# Patient Record
Sex: MALE | Race: WHITE
[De-identification: names, ages, dates, MRNs, and addresses within clinical notes are randomized per-mention and may not be internally consistent; named-entity substitution may affect disease eponyms.]

---

## 2013-09-13 VITALS — DIASTOLIC BLOOD PRESSURE: 67 MMHG | SYSTOLIC BLOOD PRESSURE: 122 MMHG

## 2022-07-12 ENCOUNTER — HOSPITAL ENCOUNTER (OUTPATIENT)
Dept: HOSPITAL 33 - ED | Age: 77
Setting detail: OBSERVATION
LOS: 2 days | Discharge: HOME HEALTH | End: 2022-07-14
Attending: INTERNAL MEDICINE | Admitting: INTERNAL MEDICINE
Payer: MEDICARE

## 2022-07-12 DIAGNOSIS — M79.604: ICD-10-CM

## 2022-07-12 DIAGNOSIS — R41.82: ICD-10-CM

## 2022-07-12 DIAGNOSIS — L08.9: ICD-10-CM

## 2022-07-12 DIAGNOSIS — M86.661: Primary | ICD-10-CM

## 2022-07-12 DIAGNOSIS — K74.60: ICD-10-CM

## 2022-07-12 DIAGNOSIS — Z20.828: ICD-10-CM

## 2022-07-12 DIAGNOSIS — Z79.899: ICD-10-CM

## 2022-07-12 LAB
ALBUMIN SERPL-MCNC: 3.1 G/DL (ref 3.5–5)
ALP SERPL-CCNC: 134 U/L (ref 38–126)
ALT SERPL-CCNC: 15 U/L (ref 0–50)
ANION GAP SERPL CALC-SCNC: 9.9 MEQ/L (ref 5–15)
AST SERPL QL: 37 U/L (ref 17–59)
BASOPHILS # BLD AUTO: 0.02 X10^3/UL (ref 0–0.4)
BILIRUB BLD-MCNC: 0.8 MG/DL (ref 0.2–1.3)
BUN SERPL-MCNC: 14 MG/DL (ref 9–20)
CALCIUM SPEC-MCNC: 8.9 MG/DL (ref 8.4–10.2)
CHLORIDE SERPL-SCNC: 104 MMOL/L (ref 98–107)
CO2 SERPL-SCNC: 29 MMOL/L (ref 22–30)
CREAT SERPL-MCNC: 0.8 MG/DL (ref 0.66–1.25)
EOSINOPHIL # BLD AUTO: 0.02 X10^3/UL (ref 0–0.5)
FLUAV AG NPH QL IA: NEGATIVE
FLUBV AG NPH QL IA: NEGATIVE
GFR SERPLBLD BASED ON 1.73 SQ M-ARVRAT: > 60 ML/MIN
GLUCOSE SERPL-MCNC: 91 MG/DL (ref 74–106)
GLUCOSE UR-MCNC: NEGATIVE MG/DL
HCT VFR BLD AUTO: 32.1 % (ref 42–50)
HGB BLD-MCNC: 10.2 G/DL (ref 12.5–18)
LYMPHOCYTES # SPEC AUTO: 1.18 X10^3/UL (ref 1–4.6)
MCH RBC QN AUTO: 30.6 PG (ref 26–32)
MCHC RBC AUTO-ENTMCNC: 31.8 G/DL (ref 32–36)
MONOCYTES # BLD AUTO: 0.48 X10^3/UL (ref 0–1.3)
PLATELET # BLD AUTO: 166 X10^3/UL (ref 150–450)
POTASSIUM SERPLBLD-SCNC: 3.9 MMOL/L (ref 3.5–5.1)
PROT SERPL-MCNC: 6.9 G/DL (ref 6.3–8.2)
PROT UR STRIP-MCNC: 30 MG/DL
RBC # BLD AUTO: 3.33 X10^6/UL (ref 4.1–5.6)
RBC # UR AUTO: (no result) ERY/UL (ref 0–5)
RBC #/AREA URNS HPF: (no result) /HPF (ref 0–2)
RSV AG SPEC QL IA: NEGATIVE
SARS-COV-2 AG RESP QL IA.RAPID: NEGATIVE
SODIUM SERPL-SCNC: 138 MMOL/L (ref 137–145)
UA DIPSTICK PNL UR: (no result)
URINE CULTURED INDICATED?: YES
WBC # BLD AUTO: 5.7 X10^3/UL (ref 4–10.5)
WBC #/AREA URNS HPF: (no result) /HPF (ref 0–5)

## 2022-07-12 PROCEDURE — 87070 CULTURE OTHR SPECIMN AEROBIC: CPT

## 2022-07-12 PROCEDURE — 71045 X-RAY EXAM CHEST 1 VIEW: CPT

## 2022-07-12 PROCEDURE — 81015 MICROSCOPIC EXAM OF URINE: CPT

## 2022-07-12 PROCEDURE — 0241U: CPT

## 2022-07-12 PROCEDURE — 85025 COMPLETE CBC W/AUTO DIFF WBC: CPT

## 2022-07-12 PROCEDURE — 99284 EMERGENCY DEPT VISIT MOD MDM: CPT

## 2022-07-12 PROCEDURE — 87086 URINE CULTURE/COLONY COUNT: CPT

## 2022-07-12 PROCEDURE — 85652 RBC SED RATE AUTOMATED: CPT

## 2022-07-12 PROCEDURE — 36415 COLL VENOUS BLD VENIPUNCTURE: CPT

## 2022-07-12 PROCEDURE — G0378 HOSPITAL OBSERVATION PER HR: HCPCS

## 2022-07-12 PROCEDURE — 87040 BLOOD CULTURE FOR BACTERIA: CPT

## 2022-07-12 PROCEDURE — 73560 X-RAY EXAM OF KNEE 1 OR 2: CPT

## 2022-07-12 PROCEDURE — 80053 COMPREHEN METABOLIC PANEL: CPT

## 2022-07-12 PROCEDURE — 83605 ASSAY OF LACTIC ACID: CPT

## 2022-07-12 RX ADMIN — CELECOXIB SCH MG: 100 CAPSULE ORAL at 21:49

## 2022-07-12 RX ADMIN — PREGABALIN SCH MG: 50 CAPSULE ORAL at 21:49

## 2022-07-12 RX ADMIN — METRONIDAZOLE SCH MLS/HR: 500 INJECTION, SOLUTION INTRAVENOUS at 18:47

## 2022-07-12 RX ADMIN — OXYCODONE HYDROCHLORIDE PRN MG: 5 TABLET ORAL at 22:33

## 2022-07-12 RX ADMIN — DEXTROSE MONOHYDRATE SCH MLS/HR: 50 INJECTION, SOLUTION INTRAVENOUS at 18:11

## 2022-07-12 NOTE — XRAY
Indication: Injection to right knee.



Comparison: None



AP/crosstable lateral right knee using portable technique demonstrates

posterior femur shaft cortical fracture of uncertain chronicity.  Elsewhere

osteopenia, total knee arthroplasty with intact prosthesis, chronic appearing

tibial deformity presumed sequela of old injury, and moderate scattered

vascular calcifications.

## 2022-07-12 NOTE — XRAY
Indication: Confusion.



Comparison: None



Portable chest hyperinflated and clear with a few incidental tiny calcified

granulomas.  Heart not enlarged with left central venous access catheter.

Bony thorax intact with mild osteopenia, advanced bilateral shoulder

degenerative arthropathy, mild double curvature scoliosis, and remote T11-T12

compression fractures.



Impression: Nonacute chest with chronic features.

## 2022-07-12 NOTE — PCM.HP
History of Present Illness





- Chief Complaint


Chief Complaint: pain in leg right side


History of Present Illness: 


 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 

factor VIII presents with confusion.  The assessment by his caregiver and power 

of  is that yesterday he was more confused he seemed very out of it and 

was not even fully dressed yesterday.  The caregiver did visit him 4 times today

he seemed better but he decided to bring him to the ER.  He is followed for his 

hemophilia at [facility] and he is followed for chronic infection of an 

artificial knee at wound care at [facility].  Caregiver is concerned that

this might represent a recurrence of generalized sepsis since he has been 

confused with infection in the past.Presently he is on doxycycline and a culture

was done at [facility] well want wound care on 7 8 which grew negati

ve.The caregiver also reports that the patient has infection in the right leg 

which is not responded to antibiotics he was told that the only cure would be to

amputate the right lower extremity just below the hip but that was not done 

because the risk of surgery for this patient is too great.


Timing/Duration: day(s) (2)


Severity: moderate


Associated Symptoms: loss of appetite, malaise, weakness








- Review of Systems


Constitutional: No Fever, No Chills


Eyes: No Symptoms


Ears, Nose, & Throat: No Symptoms


Respiratory: No Cough, No Short Of Breath


Cardiac: No Chest Pain, No Edema, No Syncope


Abdominal/Gastrointestinal: No Abdominal Pain, No Nausea, No Vomiting, No 

Diarrhea


Genitourinary Symptoms: No Dysuria


Musculoskeletal: Joint Redness, Joint Pain, No Back Pain, No Neck Pain


Skin: No Rash


Neurological: No Dizziness, No Focal Weakness, No Sensory Changes


Psychological: No Symptoms


Endocrine: No Symptoms


Hematologic/Lymphatic: No Symptoms


Immunological/Allergic: No Symptoms





Medications & Allergies


Home Medications: 


                              Home Medication List





Factor XIII [Corifact] 3,000 units IV BID 05/15/13 [History Confirmed 07/12/22]


Lorazepam 1 mg*** [Ativan 1 MG***] 1 - 2 mg PO HS PRN 05/15/13 [History 

Confirmed 07/12/22]


Methadone HCl 10 mg*** [DOLOPHINE 10MG Tablet***] 2 tab PO TID PRN PRN 05/15/13 

[History Confirmed 07/12/22]


Celecoxib [Celebrex] 200 mg PO BID 08/20/13 [History Confirmed 07/12/22]


Docusate Sodium 100 mg*** [Docusate Sodium 100 MG***] 100 mg PO DAILY 07/12/22 

[History Confirmed 07/12/22]


Famotidine 20 mg PO BID 07/12/22 [History Confirmed 07/12/22]


Folic Acid/Vit B Complex and C [B-Complex Plus Vitamin C] 1 each PO DAILY 

07/12/22 [History Confirmed 07/12/22]


Gabapentin *** [Neurontin ***] 1 - 2 tab PO TID 07/12/22 [History Confirmed 

07/12/22]


Oxycodone HCl 15 mg PO Q4H PRN PRN 07/12/22 [History Confirmed 07/12/22]


Pregabalin 50 mg*** [Lyrica 50MG***] 50 mg PO BID 07/12/22 [History Confirmed 

07/12/22]


ondansetron HCL [Zofran] 4 mg PO DAILY 07/12/22 [History Confirmed 07/12/22]








Allergies/Adverse Reactions: 


                                    Allergies











Allergy/AdvReac Type Severity Reaction Status Date / Time


 


No Known Drug Allergies Allergy   Verified 07/12/22 17:54














- Past Medical History


Past Medical History: Yes


Neurological History: No Pertinent History


ENT History: No Pertinent History


Cardiac History: No Pertinent History


Respiratory History: No Pertinent History


Endocrine Medical History: No Pertinent History


Musculoskelatal History: Osteoarthritis, Osteoporosis


GI Medical History: Cirrhosis


 History: No Pertinent History


Pyscho-Social History: No Pertinent History


Male Reproductive Disorders: No Pertinent History


Comment: Bilat Total knee, R total hip. Hx of hemophilia





- Past Surgical History


Past Surgical History: Yes


Neuro Surgical History: No Pertinent History


Cardiac History: No Pertinent History


Respiratory Surgery: No Pertinent History


GI Surgical History: No Pertinent History


Genitourinary Surgical Hx: No Pertinent History


Musculskeletal Surgical Hx: Joint Replacement, Orthopedic Surgery


Male Surgical History: No Pertinent History


Other Surgical History: left elbow spurs removed, Bilateral knee replacements, 

Left hip replacement.





- Social History


Smoking Status: Never smoker


Exposure to second hand smoke: No


Alcohol: None


Drug Use: none





- Physical Exam


Vital Signs: 


                               Vital Signs - 24 hr











  Temp Pulse Resp BP Pulse Ox


 


 07/12/22 20:00  96.8 F  67  16  168/77  98


 


 07/12/22 17:33  98.7 F  71  19  178/77  97


 


 07/12/22 15:31      100


 


 07/12/22 15:00   71  17  175/81  99


 


 07/12/22 14:11   75  17  172/84  100


 


 07/12/22 13:11  98.6 F  79  19  181/93  100











General Appearance: no apparent distress, alert


Neurologic Exam: alert, oriented x 3, cooperative, normal mood/affect, nml 

cerebellar function, nml station & gait, sensation nml, No motor deficits


Eye Exam: PERRL/EOMI, eyes nml inspection


Ears, Nose, Throat Exam: normal ENT inspection, TMs normal, pharynx normal, 

moist mucous membranes


Neck Exam: normal inspection, non-tender, supple, full range of motion


Respiratory Exam: normal breath sounds, lungs clear, No respiratory distress


Cardiovascular Exam: regular rate/rhythm, normal heart sounds, normal peripheral

pulses


Gastrointestinal/Abdomen Exam: soft, normal bowel sounds, No tenderness, No mass


Back Exam: normal inspection, normal range of motion, No CVA tenderness, No 

vertebral tenderness


Extremity Exam: normal inspection, normal range of motion, pelvis stable


Skin Exam: normal color, warm, dry, No rash


Lymphatic Exam: No adenopathy





Results





- Labs


Lab/Micro Results: 


                            Lab Results-Last 24 Hours











  07/12/22 07/12/22 07/12/22 Range/Units





  13:30 13:30 13:30 


 


WBC  5.7    (4.0-10.5)  x10^3/uL


 


RBC  3.33 L    (4.1-5.6)  x10^6/uL


 


Hgb  10.2 L    (12.5-18.0)  g/dL


 


Hct  32.1 L    (42-50)  %


 


MCV  96.4    ()  fL


 


MCH  30.6    (26-32)  pg


 


MCHC  31.8 L    (32-36)  g/dL


 


RDW  13.2    (11.5-14.0)  %


 


Plt Count  166    (150-450)  x10^3/uL


 


MPV  9.5    (7.5-11.0)  fL


 


Gran %  69.7 H    (36.0-66.0)  %


 


Immature Gran % (Auto)  0.2    (0.00-0.4)  %


 


Nucleat RBC Rel Count  0.0    (0.00-0.1)  %


 


Eos # (Auto)  0.02    (0-0.5)  x10^3/uL


 


Immature Gran # (Auto)  0.01    (0.00-0.03)  x10^3u/L


 


Absolute Lymphs (auto)  1.18    (1.0-4.6)  x10^3/uL


 


Absolute Monos (auto)  0.48    (0.0-1.3)  x10^3/uL


 


Absolute Nucleated RBC  0.00    (0.00-0.01)  x10^3u/L


 


Lymphocytes %  20.8 L    (24.0-44.0)  %


 


Monocytes %  8.5    (0.0-12.0)  %


 


Eosinophils %  0.4    (0.00-5.0)  %


 


Basophils %  0.4    (0.0-0.4)  %


 


Absolute Granulocytes  3.95    (1.4-6.9)  x10^3/uL


 


Basophils #  0.02    (0-0.4)  x10^3/uL


 


ESR     (0-15)  mm/hr


 


Sodium    138  (137-145)  mmol/L


 


Potassium    3.9  (3.5-5.1)  mmol/L


 


Chloride    104  ()  mmol/L


 


Carbon Dioxide    29  (22-30)  mmol/L


 


Anion Gap    9.9  (5-15)  MEQ/L


 


BUN    14  (9-20)  mg/dL


 


Creatinine    0.80  (0.66-1.25)  mg/dL


 


Estimated GFR    > 60.0  ML/MIN


 


Glucose    91  ()  mg/dL


 


Lactic Acid   0.8   (0.4-2.0)  


 


Calcium    8.9  (8.4-10.2)  mg/dL


 


Total Bilirubin    0.80  (0.2-1.3)  mg/dL


 


AST    37  (17-59)  U/L


 


ALT    15  (0-50)  U/L


 


Alkaline Phosphatase    134 H  ()  U/L


 


Serum Total Protein    6.9  (6.3-8.2)  g/dL


 


Albumin    3.1 L  (3.5-5.0)  g/dL


 


Urinalys Dipstick Clnc     


 


Urine Color     (YELLOW)  


 


Urine Appearance     (CLEAR)  


 


Urine pH     (5-6)  


 


Ur Specific Gravity     (1.005-1.025)  


 


POC Urine Protein Conf     (Negative)  


 


Urine Ketones     (NEGATIVE)  


 


Urine Nitrite     (NEGATIVE)  


 


Urine Bilirubin     (NEGATIVE)  


 


Urine Urobilinogen     (0-1)  mg/dL


 


Urine Leukocytes     (NEGATIVE)  


 


Urine WBC (Auto)     (0-5)  /HPF


 


Urine RBC (Auto)     (0-2)  /HPF


 


U Epithel Cells (Auto)     (FEW)  /HPF


 


Urine Bacteria (Auto)     (NEGATIVE)  /HPF


 


Urine RBC     (0-5)  Kevin/ul


 


Urine Mucus (Auto)     (NEGATIVE)  /HPF


 


Ur Culture Indicated?     


 


Urine Glucose     (NEGATIVE)  mg/dL


 


Influenza Type A Ag     (NEGATIVE)  


 


Influenza Type B Ag     (NEGATIVE)  


 


RSV (PCR)     (Negative)  


 


SARS-CoV-2 (PCR)     (NEGATIVE)  














  07/12/22 07/12/22 07/12/22 Range/Units





  13:58 14:06 15:10 


 


WBC     (4.0-10.5)  x10^3/uL


 


RBC     (4.1-5.6)  x10^6/uL


 


Hgb     (12.5-18.0)  g/dL


 


Hct     (42-50)  %


 


MCV     ()  fL


 


MCH     (26-32)  pg


 


MCHC     (32-36)  g/dL


 


RDW     (11.5-14.0)  %


 


Plt Count     (150-450)  x10^3/uL


 


MPV     (7.5-11.0)  fL


 


Gran %     (36.0-66.0)  %


 


Immature Gran % (Auto)     (0.00-0.4)  %


 


Nucleat RBC Rel Count     (0.00-0.1)  %


 


Eos # (Auto)     (0-0.5)  x10^3/uL


 


Immature Gran # (Auto)     (0.00-0.03)  x10^3u/L


 


Absolute Lymphs (auto)     (1.0-4.6)  x10^3/uL


 


Absolute Monos (auto)     (0.0-1.3)  x10^3/uL


 


Absolute Nucleated RBC     (0.00-0.01)  x10^3u/L


 


Lymphocytes %     (24.0-44.0)  %


 


Monocytes %     (0.0-12.0)  %


 


Eosinophils %     (0.00-5.0)  %


 


Basophils %     (0.0-0.4)  %


 


Absolute Granulocytes     (1.4-6.9)  x10^3/uL


 


Basophils #     (0-0.4)  x10^3/uL


 


ESR   52 H   (0-15)  mm/hr


 


Sodium     (137-145)  mmol/L


 


Potassium     (3.5-5.1)  mmol/L


 


Chloride     ()  mmol/L


 


Carbon Dioxide     (22-30)  mmol/L


 


Anion Gap     (5-15)  MEQ/L


 


BUN     (9-20)  mg/dL


 


Creatinine     (0.66-1.25)  mg/dL


 


Estimated GFR     ML/MIN


 


Glucose     ()  mg/dL


 


Lactic Acid     (0.4-2.0)  


 


Calcium     (8.4-10.2)  mg/dL


 


Total Bilirubin     (0.2-1.3)  mg/dL


 


AST     (17-59)  U/L


 


ALT     (0-50)  U/L


 


Alkaline Phosphatase     ()  U/L


 


Serum Total Protein     (6.3-8.2)  g/dL


 


Albumin     (3.5-5.0)  g/dL


 


Urinalys Dipstick Clnc  MAIN LAB    


 


Urine Color  YELLOW    (YELLOW)  


 


Urine Appearance  CLEAR    (CLEAR)  


 


Urine pH  6.5    (5-6)  


 


Ur Specific Gravity  1.025    (1.005-1.025)  


 


POC Urine Protein Conf  30    (Negative)  


 


Urine Ketones  SMALL-15    (NEGATIVE)  


 


Urine Nitrite  NEGATIVE    (NEGATIVE)  


 


Urine Bilirubin  NEGATIVE    (NEGATIVE)  


 


Urine Urobilinogen  0.2    (0-1)  mg/dL


 


Urine Leukocytes  SMALL    (NEGATIVE)  


 


Urine WBC (Auto)  26-50    (0-5)  /HPF


 


Urine RBC (Auto)  26-50    (0-2)  /HPF


 


U Epithel Cells (Auto)  RARE    (FEW)  /HPF


 


Urine Bacteria (Auto)  FEW    (NEGATIVE)  /HPF


 


Urine RBC  MODERATE    (0-5)  Kevin/ul


 


Urine Mucus (Auto)  SLIGHT    (NEGATIVE)  /HPF


 


Ur Culture Indicated?  YES    


 


Urine Glucose  NEGATIVE    (NEGATIVE)  mg/dL


 


Influenza Type A Ag    NEGATIVE  (NEGATIVE)  


 


Influenza Type B Ag    NEGATIVE  (NEGATIVE)  


 


RSV (PCR)    NEGATIVE  (Negative)  


 


SARS-CoV-2 (PCR)    NEGATIVE  (NEGATIVE)  














- Radiology Impressions


Radiology Exams & Impressions: 


                              Radiology Procedures











 Category Date Time Status


 


 CHEST 1 VIEW (PORTABLE) Stat Exams  07/12/22 13:23 Completed


 


 KNEE (1 OR 2 VIEW) Stat Exams  07/12/22 13:23 Completed














Assessment/Plan


(1) Chronic osteomyelitis involving lower leg


Current Visit: Yes   Status: Acute   


Qualifiers: 


   Laterality: right   Qualified Code(s): M86.661 - Other chronic osteomyelitis,

right tibia and fibula   


Assessment & Plan: 


                                 Chief Complaint





Diagnosis                        osteomylitis





                                    Allergies











Allergy/AdvReac Type Severity Reaction Status Date / Time


 


No Known Drug Allergies Allergy   Verified 07/12/22 17:54








                           Vital Signs (Last 24 hours)











  Temp Pulse Resp BP Pulse Ox


 


 07/12/22 20:00  96.8 F  67  16  168/77  98


 


 07/12/22 17:33  98.7 F  71  19  178/77  97


 


 07/12/22 15:31      100


 


 07/12/22 15:00   71  17  175/81  99


 


 07/12/22 14:11   75  17  172/84  100


 


 07/12/22 13:11  98.6 F  79  19  181/93  100








                                Home Medications











 Medication  Instructions  Recorded  Confirmed  Last Taken  Type


 


Docusate Sodium 100 mg*** 100 mg PO DAILY 07/12/22 07/12/22 Unknown History





[Docusate Sodium 100 MG***]     


 


Famotidine 20 mg PO BID 07/12/22 07/12/22 Unknown History


 


Folic Acid/Vit B Complex and C 1 each PO DAILY 07/12/22 07/12/22 Unknown History





[B-Complex Plus Vitamin C]     


 


Gabapentin *** [Neurontin ***] 1 - 2 tab PO TID 07/12/22 07/12/22 Unknown 

History


 


Oxycodone HCl 15 mg PO Q4H PRN PRN 07/12/22 07/12/22 Unknown History


 


Pregabalin 50 mg*** [Lyrica 50 mg PO BID 07/12/22 07/12/22 Unknown History





50MG***]     


 


ondansetron HCL [Zofran] 4 mg PO DAILY 07/12/22 07/12/22 Unknown History








                               Current Medications











Generic Name Dose Route Start Last Admin





  Trade Name Freq  PRN Reason Stop Dose Admin


 


Celecoxib  200 mg  07/12/22 22:00 





  Celecoxib 100 Mg Capsule  PO  08/11/22 21:59 





  BID ABNER  


 


Famotidine  20 mg  07/12/22 22:00 





  Famotidine 20 Mg Tablet  PO  07/12/22 22:01 





  ONCE ONE  


 


Gabapentin  300 mg  07/12/22 22:00 





  Gabapentin 300 Mg Capsule  PO  08/11/22 21:59 





  BID ABNER  


 


Sodium Chloride  1,000 mls @ 100 mls/hr  07/12/22 13:30  07/12/22 13:33





  Sodium Chloride 0.9% 1000 Ml  IV  08/11/22 13:29  100 mls/hr





  .Q10H ABNER   Administration


 


Ceftazidime 2 gm/ Dextrose  100 mls @ 200 mls/hr  07/12/22 15:30  07/12/22 18:11





  IV  08/11/22 15:29  200 mls/hr





  Q8HT ABNER   Administration


 


Metronidazole  500 mg in 100 mls @ 200 mls/hr  07/12/22 18:00  07/12/22 18:47





  Flagyl 500 Mg Ivpb  IV  08/11/22 17:59  200 mls/hr





  Q6HT ABNER   Administration


 


Vancomycin HCl 500 gm/ Sodium  250 mls @ 125 mls/hr  07/13/22 22:00 





  Chloride  IV  08/12/22 21:59 





  Q12HT ABNER  


 


Lorazepam  1 mg  07/12/22 22:00 





  Lorazepam 1 Mg Tablet  PO  08/11/22 21:59 





  HS ABNER  


 


Methadone HCl  20 mg  07/12/22 22:00  07/12/22 18:11





  Methadone Hcl 10 Mg Tab  PO  07/17/22 21:59  20 mg





  TID ANBER   Administration


 


Miscellaneous Medication  1 ea  07/12/22 22:00  07/12/22 20:26





  Miscellaneous Medication Order 1 Ea Each    07/12/22 22:01  1 ea





  NOW ONE   Administration


 


Ondansetron HCl  4 mg  07/12/22 15:33 





  Ondansetron Hcl 4 Mg/2 Ml Vial  IV  08/11/22 15:32 





  Q6H PRN PRN  





  NAUSEA/VOMITING  


 


Pregabalin  50 mg  07/12/22 22:00 





  Pregabalin 50 Mg Capsule  PO  08/11/22 21:59 





  BID ABNER  














Discontinued Medications














Generic Name Dose Route Start Last Admin





  Trade Name Freq  PRN Reason Stop Dose Admin


 


Bacitracin Zinc  Confirm  07/12/22 13:26 





  Bacitracin Packet 1 Each Pckt  Administered  07/12/22 13:27 





  Dose  





  10 each  





  .ROUTE  





  .STK-MED ONE  


 


Bacitracin Zinc  9 each  07/12/22 13:43  07/12/22 13:44





  Bacitracin Packet 1 Each Pckt  TP  07/12/22 13:44  9 each





  STAT ONE   Administration


 


Vancomycin HCl  1 gm in 200 mls @ 125 mls/hr  07/12/22 15:17  07/12/22 16:03





  Vancomycin 1 Gram/200 Ml Bag  IV  07/12/22 16:52  125 mls/hr





  STAT ONE   125 mls/hr





    Administration


 


Metronidazole  500 mg in 100 mls @ 200 mls/hr  07/12/22 15:19  07/12/22 16:01





  Flagyl 500 Mg Ivpb  IV  07/12/22 15:48  Infused





  STAT STA   Infusion


 


Metronidazole  Confirm  07/12/22 15:23 





  Flagyl 500 Mg Ivpb  Administered  07/12/22 15:24 





  Dose  





  500 mg in 100 mls @ ud  





  IV  





  .STK-MED ONE  


 


Vancomycin HCl  Confirm  07/12/22 16:03 





  Vancomycin 1 Gram/200 Ml Bag  Administered  07/12/22 16:04 





  Dose  





  1 gm in 200 mls @ ud  





  IV  





  .STK-MED ONE  


 


Vancomycin HCl  1 gm in 200 mls @ 125 mls/hr  07/12/22 16:15  07/12/22 18:38





  Vancomycin 1 Gram/200 Ml Bag  IV  08/11/22 16:14  Not Given





  Q12H ABNER  


 


Methadone HCl  Confirm  07/12/22 18:06 





  Methadone Hcl 10 Mg Tab  Administered  07/12/22 18:07 





  Dose  





  20 mg  





  .ROUTE  





  .STK-MED ONE  








                         Intake & Output (Last 24 hours)











 07/10/22 07/11/22 07/12/22 07/13/22





 11:59 11:59 11:59 11:59


 


Intake Total    120


 


Balance    120


 


Weight    46.5 kg








                      Microbiology Results (Last 24 hours)





07/12/22 13:58   Clean Catch Midstream   Urine Culture - Pending


07/12/22 13:25   Skin - Right Front   Wound Culture - Pending


07/12/22 13:30   Blood   Blood Culture Gram Stain - Pending


07/12/22 13:30   Blood   Blood Culture - Pending





                       Laboratory Results (Last 24 hours)











  07/12/22 07/12/22 07/12/22





  15:10 14:06 13:58


 


WBC   


 


RBC   


 


Hgb   


 


Hct   


 


MCV   


 


MCH   


 


MCHC   


 


RDW   


 


Plt Count   


 


MPV   


 


Gran %   


 


Immature Gran % (Auto)   


 


Nucleat RBC Rel Count   


 


Eos # (Auto)   


 


Immature Gran # (Auto)   


 


Absolute Lymphs (auto)   


 


Absolute Monos (auto)   


 


Absolute Nucleated RBC   


 


Lymphocytes %   


 


Monocytes %   


 


Eosinophils %   


 


Basophils %   


 


Absolute Granulocytes   


 


Basophils #   


 


ESR   52 H 


 


Sodium   


 


Potassium   


 


Chloride   


 


Carbon Dioxide   


 


Anion Gap   


 


BUN   


 


Creatinine   


 


Estimated GFR   


 


Glucose   


 


Lactic Acid   


 


Calcium   


 


Total Bilirubin   


 


AST   


 


ALT   


 


Alkaline Phosphatase   


 


Serum Total Protein   


 


Albumin   


 


Urinalys Dipstick Clnc    MAIN LAB


 


Urine Color    YELLOW


 


Urine Appearance    CLEAR


 


Urine pH    6.5


 


Ur Specific Gravity    1.025


 


POC Urine Protein Conf    30


 


Urine Ketones    SMALL-15


 


Urine Nitrite    NEGATIVE


 


Urine Bilirubin    NEGATIVE


 


Urine Urobilinogen    0.2


 


Urine Leukocytes    SMALL


 


Urine WBC (Auto)    26-50


 


Urine RBC (Auto)    26-50


 


U Epithel Cells (Auto)    RARE


 


Urine Bacteria (Auto)    FEW


 


Urine RBC    MODERATE


 


Urine Mucus (Auto)    SLIGHT


 


Ur Culture Indicated?    YES


 


Urine Glucose    NEGATIVE


 


Influenza Type A Ag  NEGATIVE  


 


Influenza Type B Ag  NEGATIVE  


 


RSV (PCR)  NEGATIVE  


 


SARS-CoV-2 (PCR)  NEGATIVE  














  07/12/22 07/12/22 07/12/22





  13:30 13:30 13:30


 


WBC    5.7


 


RBC    3.33 L


 


Hgb    10.2 L


 


Hct    32.1 L


 


MCV    96.4


 


MCH    30.6


 


MCHC    31.8 L


 


RDW    13.2


 


Plt Count    166


 


MPV    9.5


 


Gran %    69.7 H


 


Immature Gran % (Auto)    0.2


 


Nucleat RBC Rel Count    0.0


 


Eos # (Auto)    0.02


 


Immature Gran # (Auto)    0.01


 


Absolute Lymphs (auto)    1.18


 


Absolute Monos (auto)    0.48


 


Absolute Nucleated RBC    0.00


 


Lymphocytes %    20.8 L


 


Monocytes %    8.5


 


Eosinophils %    0.4


 


Basophils %    0.4


 


Absolute Granulocytes    3.95


 


Basophils #    0.02


 


ESR   


 


Sodium  138  


 


Potassium  3.9  


 


Chloride  104  


 


Carbon Dioxide  29  


 


Anion Gap  9.9  


 


BUN  14  


 


Creatinine  0.80  


 


Estimated GFR  > 60.0  


 


Glucose  91  


 


Lactic Acid   0.8 


 


Calcium  8.9  


 


Total Bilirubin  0.80  


 


AST  37  


 


ALT  15  


 


Alkaline Phosphatase  134 H  


 


Serum Total Protein  6.9  


 


Albumin  3.1 L  


 


Urinalys Dipstick Clnc   


 


Urine Color   


 


Urine Appearance   


 


Urine pH   


 


Ur Specific Gravity   


 


POC Urine Protein Conf   


 


Urine Ketones   


 


Urine Nitrite   


 


Urine Bilirubin   


 


Urine Urobilinogen   


 


Urine Leukocytes   


 


Urine WBC (Auto)   


 


Urine RBC (Auto)   


 


U Epithel Cells (Auto)   


 


Urine Bacteria (Auto)   


 


Urine RBC   


 


Urine Mucus (Auto)   


 


Ur Culture Indicated?   


 


Urine Glucose   


 


Influenza Type A Ag   


 


Influenza Type B Ag   


 


RSV (PCR)   


 


SARS-CoV-2 (PCR)   








                             Orders (Last 24 hours)











 Category Date Time Status


 


 Bedrest ROUTINE Activity  07/12/22 15:34 Active


 


 Code Status Order ROUTINE Care  07/12/22 15:32 Active


 


 IV Care Q6H Care  07/12/22 15:32 Active


 


 Neuro Checks Q4H Care  07/12/22 15:33 Active


 


 Place in Observation ROUTINE Care  07/12/22 15:32 Active


 


 Vital Signs Q4H Care  07/12/22 15:33 Active


 


 Weight,Daily 0600 Care  07/12/22 15:33 Active


 


 House Regular Diet Diet  07/12/22 Dinner Active


 


 CHEST 1 VIEW (PORTABLE) Stat Exams  07/12/22 13:23 Completed


 


 KNEE (1 OR 2 VIEW) Stat Exams  07/12/22 13:23 Completed


 


 BLOOD CULTURE Stat Lab  07/12/22 13:17 Received


 


 CBC W DIFF AM.LAB Lab  07/13/22 04:00 Ordered


 


 CBC W DIFF Stat Lab  07/12/22 13:30 Completed


 


 CMP AM.LAB Lab  07/13/22 04:00 Ordered


 


 CMP Stat Lab  07/12/22 13:30 Completed


 


 COVID/FLU/RSV Panel Stat Lab  07/12/22 15:10 Completed


 


 CULTURE,URINE Stat Lab  07/12/22 13:58 Received


 


 CULTURE,WOUND Stat Lab  07/12/22 13:25 Received


 


 Lactic Acid Stat Lab  07/12/22 13:30 Completed


 


 SED RATE [Erythrocyte Sedimentation Rate] Stat Lab  07/12/22 14:06 Completed


 


 UA W/RFX CULTURE Stat Lab  07/12/22 13:58 Completed


 


 Bacitracin Packet*** [Baciguent Packet***] Med  07/12/22 13:26 Discontinued





 10 each .ROUTE .STK-MED ONE   


 


 Bacitracin Packet*** [Baciguent Packet***] Med  07/12/22 13:43 Discontinued





 9 each TP STAT ONE   


 


 Ceftazidime Pentahydrate [Fortaz/Tazicef] 2 gm Med  07/12/22 15:30 Active





 D5w 100 ml [D5w 100ML Mini Bag 100 ML] 100 ml   





 IV Q8HT   


 


 Celecoxib 100 mg** [celeBREX 100 MG**] Med  07/12/22 22:00 Active





 200 mg PO BID   


 


 Famotidine 20 mg*** [Pepcid 20 MG***] Med  07/12/22 22:00 Once





 20 mg PO ONCE ONE   


 


 Gabapentin *** [Neurontin ***] Med  07/12/22 22:00 Active





 300 mg PO BID   


 


 Lorazepam 1 mg*** [Ativan 1 MG***] Med  07/12/22 22:00 Active





 1 mg PO HS   


 


 Methadone HCl 10 mg*** [DOLOPHINE 10MG Tablet***] Med  07/12/22 18:06 

Discontinued





 20 mg .ROUTE .STK-MED ONE   


 


 Methadone HCl 10 mg*** [DOLOPHINE 10MG Tablet***] Med  07/12/22 22:00 Active





 20 mg PO TID   


 


 Metronidazole 500 mg Premix [Flagyl 500 mg Ivpb] Med  07/12/22 18:00 Active





 500 mg in 100 ml IV Q6HT   


 


 Metronidazole 500 mg Premix [Flagyl 500 mg Ivpb] Med  07/12/22 15:19 

Discontinued





 500 mg in 100 ml IV STAT   


 


 Metronidazole 500 mg Premix [Flagyl 500 mg Ivpb] Med  07/12/22 15:23 

Discontinued





 500 mg in 100 ml IV UD   


 


 Miscellaneous Medication Order Med  07/12/22 22:00 Once





 1 ea MC NOW ONE   


 


 NaCl 0.9% 1000 ml [Sodium Chloride 0.9% 1000 ML] 1,000 Med  07/12/22 13:30 

Active





 ml   





  mls/hr   


 


 Ondansetron HCl 4 mg/2 ml** [Zofran 4 MG/2 ML VIAL**] Med  07/12/22 15:33 

Active





 4 mg IV Q6H PRN PRN   


 


 Pregabalin 50 mg*** [Lyrica 50MG***] Med  07/12/22 22:00 Active





 50 mg PO BID   


 


 Vancomycin HCl Inj*** [Vancocin Injection***] 500 gm Med  07/13/22 22:00 Active





 NaCl 0.9% 250 ml [Sodium Chloride 0.9% 250 ML] 250 ml   





 IV Q12HT   


 


 Vancomycin/Water For Inj (Peg) [Vancomycin 1 Gram/200 Med  07/12/22 16:15 

Discontinued





 ml Bag]   





 1 gm in 200 ml IV Q12H   


 


 Vancomycin/Water For Inj (Peg) [Vancomycin 1 Gram/200 Med  07/12/22 15:17 

Discontinued





 ml Bag]   





 1 gm in 200 ml IV STAT   


 


 Vancomycin/Water For Inj (Peg) [Vancomycin 1 Gram/200 Med  07/12/22 16:03 

Discontinued





 ml Bag]   





 1 gm in 200 ml IV UD   


 


 Transfer Order Routine Transfer  07/12/22 Completed











Code(s): M86.669 - OTHER CHRONIC OSTEOMYELITIS, UNSPECIFIED TIBIA AND FIBULA

## 2022-07-12 NOTE — ERPHSYRPT
- History of Present Illness


Time Seen by Provider: 07/12/22 13:20


Source: patient (Patient was reported confused on admission but at the time of 

the exam was alert and oriented), other (Power of  caregiver)


Patient Subjective Stated Complaint: Confusion


Triage Nursing Assessment: Patient brought into ED per w/c and transferred to 

bed with assist of 1. Patient A+O X 3. Patient's skin pale, warm and dry. 

Patient's POA states patient has been having intermittent confusion and doesn't 

remember him coming to check on him several times yesterday and today. Patient 

has infected wounds noted to RLE.  Patient complains of right elbow pain and 

joint pain 5/10.


Physician History: 





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

VIII presents with confusion.  The assessment by his caregiver and power of at

[facility] is that yesterday he was more confused he seemed very out of it and was 

not even fully dressed yesterday.  The caregiver did visit him 4 times today he 

seemed better but he decided to bring him to the ER.  He is followed for his 

hemophilia at [facility] and he is followed for chronic infection of an 

artificial knee at wound care at [facility].  Caregiver is concerned that

this might represent a recurrence of generalized sepsis since he has been 

confused with infection in the past.Presently he is on doxycycline and a culture

was done at [facility] well want wound care on 7 8 which grew 

negative.The caregiver also reports that the patient has infection in the right 

leg which is not responded to antibiotics he was told that the only cure would 

be to amputate the right lower extremity just below the hip but that was not 

done because the risk of surgery for this patient is too great.


Timing/Duration: day(s) (2)


Severity: moderate


Associated Symptoms: loss of appetite, malaise, weakness


Allergies/Adverse Reactions: 








No Known Drug Allergies Allergy (Verified 07/12/22 13:10)


   





Home Medications: 








Bisacodyl 5 mg*** [Dulcolax 5 mg***] 2 tab PO BID 05/15/13 [History]


Calcium Carbonate/Vitamin D3 [Calcium 600 + Vit D Tablet] 1 each PO BID 05/15/13

[History]


Docusate Sodium 100 mg*** [Colace 100 MG***] 2 tab PO BID 05/15/13 [History]


Factor XIII [Corifact] 3,000 units IV DAILY 05/15/13 [History]


Loratadine 10 mg*** [Claritin 10 mg***] 10 mg PO BID 05/15/13 [History]


Lorazepam 1 mg*** [Ativan 1 MG***] 1 - 2 mg PO HS PRN 05/15/13 [History]


Methadone HCl 10 mg*** [DOLOPHINE 10MG Tablet***] 4 - 6 tab PO Q4-6HPRN PRN 

05/15/13 [History]


Celecoxib [Celebrex] 200 mg PO DAILY 08/20/13 [History]


Fluticasone Propionate [Flonase] 16 gm NS DAILY 08/20/13 [History]


Multivits,Th W-Ca,Fe,Oth Min [Spectravite] 1 each PO DAILY 08/20/13 [History]





Hx Tetanus, Diphtheria Vaccination/Date Given: No


Hx Influenza Vaccination/Date Given: Yes (10/12)


Hx Pneumococcal Vaccination/Date Given: Yes (unknown when)


Immunizations Up to Date: Yes





Travel Risk





- International Travel


Have you traveled outside of the country in past 3 weeks: No





- Coronavirus Screening


Are you exhibiting any of the following symptoms?: No


Close contact with a COVID-19 positive Pt in past 14-21 Days: No





- Vaccine Status


Have you recieved a Covid-19 vaccination: Yes


: Unknown





- Vaccination Dates


Date of 2cond Vaccination (if applicable): na


Dates if Unknown: na





- Review of Systems


Constitutional: Lethargy, Malaise, Weakness


Eyes: No Symptoms


Ears, Nose, & Throat: No Symptoms


Respiratory: No Cough, No Dyspnea


Cardiac: No Chest Pain, No Edema, No Syncope


Abdominal/Gastrointestinal: No Abdominal Pain, No Nausea, No Vomiting, No 

Diarrhea


Genitourinary Symptoms: No Dysuria


Musculoskeletal: Other (Examination of the lower extremities show surgical scars

 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 

tibia and a total artificial knee.  It is red and his has several weeping areas 

and apparently there is over ulcer which extends to the bone.  This is according

 to t)


Skin: Cellulitis, Decubiti, Skin Lesions


Neurological: Focal Weakness


Psychological: No Symptoms


Endocrine: Polyuria


Hematologic/Lymphatic: Easy Bleeding, Easy Bruising


Immunological/Allergic: No Symptoms





- Past Medical History


Pertinent Past Medical History: Yes


Neurological History: No Pertinent History


ENT History: No Pertinent History


Cardiac History: No Pertinent History


Respiratory History: No Pertinent History


Endocrine Medical History: No Pertinent History


Musculoskeletal History: Osteoarthritis, Osteoporosis


GI Medical History: Cirrhosis


 History: No Pertinent History


Psycho-Social History: No Pertinent History


Male Reproductive Disorders: No Pertinent History


Other Medical History: B TKA, R PHILOMENA. Hx of hemophilia





- Past Surgical History


Past Surgical History: Yes


Neuro Surgical History: No Pertinent History


Cardiac: No Pertinent History


Respiratory: No Pertinent History


Gastrointestinal: No Pertinent History


Genitourinary: No Pertinent History


Musculoskeletal: Joint Replacement, Orthopedic Surgery


Male Surgical History: No Pertinent History


Other Surgical History: left elbow spurs removed, Bilateral knee replacements, 

Left hip replacement.





- Social History


Smoking Status: Never smoker


Exposure to second hand smoke: No


Drug Use: none


Patient Lives Alone: Yes





- Nursing Vital Signs


Nursing Vital Signs: 


                               Initial Vital Signs











Temperature  98.6 F   07/12/22 13:11


 


Pulse Rate  79   07/12/22 13:11


 


Respiratory Rate  19   07/12/22 13:11


 


Blood Pressure  181/93   07/12/22 13:11


 


O2 Sat by Pulse Oximetry  100   07/12/22 13:11








                                   Pain Scale











Pain Intensity                 5

















- Physical Exam


General Appearance: mild distress


Eye Exam: PERRL/EOMI, eyes nml inspection


Ears, Nose, Throat Exam: normal ENT inspection


Neck Exam: normal inspection, non-tender, supple, full range of motion


Respiratory Exam: normal breath sounds, lungs clear, No respiratory distress


Cardiovascular Exam: regular rate/rhythm, normal heart sounds, normal peripheral

 pulses


Gastrointestinal/Abdomen Exam: soft, normal bowel sounds, No tenderness, No mass


Extremity Exam: other (Both lower extremities have had knee replacements with 

medullary rods.  The right leg is swollen has weeping lesions has ulcers and is 

warm to the touch and tender.)


Neurologic Exam: alert, oriented x 3, cooperative


Skin Exam: ecchymosis


Lymphatic Exam: No adenopathy


**SpO2 Interpretation**: normal


SpO2: 100


O2 Delivery: Room Air





- Course


Nursing assessment & vital signs reviewed: Yes


EKG Interpreted by Me: RATE, Sinus Rhythm, Right Axis Deviation, LAFB, Right Bun

dle Branch Block, Non-specific ST Changes





- Radiology Exams


  ** Chest


X-ray Interpretation: Reviewed by me





  ** Right Knee


X-ray Interpretation: Reviewed by me


Ordered Tests: 


                               Active Orders 24 hr











 Category Date Time Status


 


 CHEST 1 VIEW (PORTABLE) Stat Exams  07/12/22 13:23 Completed


 


 KNEE (1 OR 2 VIEW) Stat Exams  07/12/22 13:23 Completed


 


 BLOOD CULTURE Stat Lab  07/12/22 13:17 Received


 


 CBC W DIFF Stat Lab  07/12/22 13:30 Completed


 


 CMP Stat Lab  07/12/22 13:30 Completed


 


 CULTURE,URINE Stat Lab  07/12/22 13:58 Received


 


 CULTURE,WOUND Stat Lab  07/12/22 13:25 Received


 


 Lactic Acid Stat Lab  07/12/22 13:30 Completed


 


 SED RATE [Erythrocyte Sedimentation Rate] Stat Lab  07/12/22 14:06 Ordered


 


 UA W/RFX CULTURE Stat Lab  07/12/22 13:58 Completed








Medication Summary











Generic Name Dose Route Start Last Admin





  Trade Name Freq  PRN Reason Stop Dose Admin


 


Sodium Chloride  1,000 mls @ 100 mls/hr  07/12/22 13:30  07/12/22 13:33





  Sodium Chloride 0.9% 1000 Ml  IV  08/11/22 13:29  100 mls/hr





  .Q10H ABNER   Administration


 


Vancomycin HCl  1 gm in 200 mls @ 125 mls/hr  07/12/22 15:17 





  Vancomycin 1 Gram/200 Ml Bag  IV  07/12/22 16:52 





  STAT ONE  


 


Metronidazole  500 mg in 100 mls @ 200 mls/hr  07/12/22 15:19  07/12/22 15:24





  Flagyl 500 Mg Ivpb  IV  07/12/22 15:48  200 mls/hr





  STAT STA   200 mls/hr





    Administration


 


Ceftazidime 2 gm/ Dextrose  100 mls @ 200 mls/hr  07/12/22 15:30 





  IV  08/11/22 15:29 





  Q8H ABNER  














Discontinued Medications














Generic Name Dose Route Start Last Admin





  Trade Name Freq  PRN Reason Stop Dose Admin


 


Bacitracin Zinc  Confirm  07/12/22 13:26 





  Bacitracin Packet 1 Each Pckt  Administered  07/12/22 13:27 





  Dose  





  10 each  





  .ROUTE  





  .STK-MED ONE  


 


Bacitracin Zinc  9 each  07/12/22 13:43  07/12/22 13:44





  Bacitracin Packet 1 Each Pckt  TP  07/12/22 13:44  9 each





  STAT ONE   Administration


 


Metronidazole  Confirm  07/12/22 15:23 





  Flagyl 500 Mg Ivpb  Administered  07/12/22 15:24 





  Dose  





  500 mg in 100 mls @ ud  





  IV  





  .STK-MED ONE  











Lab/Rad Data: 


                           Laboratory Result Diagrams





                                 07/12/22 13:30 





                                 07/12/22 13:30 





                               Laboratory Results











  07/12/22 07/12/22 07/12/22 Range/Units





  13:58 13:30 13:30 


 


WBC     (4.0-10.5)  x10^3/uL


 


RBC     (4.1-5.6)  x10^6/uL


 


Hgb     (12.5-18.0)  g/dL


 


Hct     (42-50)  %


 


MCV     ()  fL


 


MCH     (26-32)  pg


 


MCHC     (32-36)  g/dL


 


RDW     (11.5-14.0)  %


 


Plt Count     (150-450)  x10^3/uL


 


MPV     (7.5-11.0)  fL


 


Gran %     (36.0-66.0)  %


 


Immature Gran % (Auto)     (0.00-0.4)  %


 


Nucleat RBC Rel Count     (0.00-0.1)  %


 


Eos # (Auto)     (0-0.5)  x10^3/uL


 


Immature Gran # (Auto)     (0.00-0.03)  x10^3u/L


 


Absolute Lymphs (auto)     (1.0-4.6)  x10^3/uL


 


Absolute Monos (auto)     (0.0-1.3)  x10^3/uL


 


Absolute Nucleated RBC     (0.00-0.01)  x10^3u/L


 


Lymphocytes %     (24.0-44.0)  %


 


Monocytes %     (0.0-12.0)  %


 


Eosinophils %     (0.00-5.0)  %


 


Basophils %     (0.0-0.4)  %


 


Absolute Granulocytes     (1.4-6.9)  x10^3/uL


 


Basophils #     (0-0.4)  x10^3/uL


 


Sodium   138   (137-145)  mmol/L


 


Potassium   3.9   (3.5-5.1)  mmol/L


 


Chloride   104   ()  mmol/L


 


Carbon Dioxide   29   (22-30)  mmol/L


 


Anion Gap   9.9   (5-15)  MEQ/L


 


BUN   14   (9-20)  mg/dL


 


Creatinine   0.80   (0.66-1.25)  mg/dL


 


Estimated GFR   > 60.0   ML/MIN


 


Glucose   91   ()  mg/dL


 


Lactic Acid    0.8  (0.4-2.0)  


 


Calcium   8.9   (8.4-10.2)  mg/dL


 


Total Bilirubin   0.80   (0.2-1.3)  mg/dL


 


AST   37   (17-59)  U/L


 


ALT   15   (0-50)  U/L


 


Alkaline Phosphatase   134 H   ()  U/L


 


Serum Total Protein   6.9   (6.3-8.2)  g/dL


 


Albumin   3.1 L   (3.5-5.0)  g/dL


 


Urinalys Dipstick Clnc  MAIN LAB    


 


Urine Color  YELLOW    (YELLOW)  


 


Urine Appearance  CLEAR    (CLEAR)  


 


Urine pH  6.5    (5-6)  


 


Ur Specific Gravity  1.025    (1.005-1.025)  


 


POC Urine Protein Conf  30    (Negative)  


 


Urine Ketones  SMALL-15    (NEGATIVE)  


 


Urine Nitrite  NEGATIVE    (NEGATIVE)  


 


Urine Bilirubin  NEGATIVE    (NEGATIVE)  


 


Urine Urobilinogen  0.2    (0-1)  mg/dL


 


Urine Leukocytes  SMALL    (NEGATIVE)  


 


Urine WBC (Auto)  26-50    (0-5)  /HPF


 


Urine RBC (Auto)  26-50    (0-2)  /HPF


 


U Epithel Cells (Auto)  RARE    (FEW)  /HPF


 


Urine Bacteria (Auto)  FEW    (NEGATIVE)  /HPF


 


Urine RBC  MODERATE    (0-5)  Kevin/ul


 


Urine Mucus (Auto)  SLIGHT    (NEGATIVE)  /HPF


 


Ur Culture Indicated?  YES    


 


Urine Glucose  NEGATIVE    (NEGATIVE)  mg/dL














  07/12/22 Range/Units





  13:30 


 


WBC  5.7  (4.0-10.5)  x10^3/uL


 


RBC  3.33 L  (4.1-5.6)  x10^6/uL


 


Hgb  10.2 L  (12.5-18.0)  g/dL


 


Hct  32.1 L  (42-50)  %


 


MCV  96.4  ()  fL


 


MCH  30.6  (26-32)  pg


 


MCHC  31.8 L  (32-36)  g/dL


 


RDW  13.2  (11.5-14.0)  %


 


Plt Count  166  (150-450)  x10^3/uL


 


MPV  9.5  (7.5-11.0)  fL


 


Gran %  69.7 H  (36.0-66.0)  %


 


Immature Gran % (Auto)  0.2  (0.00-0.4)  %


 


Nucleat RBC Rel Count  0.0  (0.00-0.1)  %


 


Eos # (Auto)  0.02  (0-0.5)  x10^3/uL


 


Immature Gran # (Auto)  0.01  (0.00-0.03)  x10^3u/L


 


Absolute Lymphs (auto)  1.18  (1.0-4.6)  x10^3/uL


 


Absolute Monos (auto)  0.48  (0.0-1.3)  x10^3/uL


 


Absolute Nucleated RBC  0.00  (0.00-0.01)  x10^3u/L


 


Lymphocytes %  20.8 L  (24.0-44.0)  %


 


Monocytes %  8.5  (0.0-12.0)  %


 


Eosinophils %  0.4  (0.00-5.0)  %


 


Basophils %  0.4  (0.0-0.4)  %


 


Absolute Granulocytes  3.95  (1.4-6.9)  x10^3/uL


 


Basophils #  0.02  (0-0.4)  x10^3/uL


 


Sodium   (137-145)  mmol/L


 


Potassium   (3.5-5.1)  mmol/L


 


Chloride   ()  mmol/L


 


Carbon Dioxide   (22-30)  mmol/L


 


Anion Gap   (5-15)  MEQ/L


 


BUN   (9-20)  mg/dL


 


Creatinine   (0.66-1.25)  mg/dL


 


Estimated GFR   ML/MIN


 


Glucose   ()  mg/dL


 


Lactic Acid   (0.4-2.0)  


 


Calcium   (8.4-10.2)  mg/dL


 


Total Bilirubin   (0.2-1.3)  mg/dL


 


AST   (17-59)  U/L


 


ALT   (0-50)  U/L


 


Alkaline Phosphatase   ()  U/L


 


Serum Total Protein   (6.3-8.2)  g/dL


 


Albumin   (3.5-5.0)  g/dL


 


Urinalys Dipstick Clnc   


 


Urine Color   (YELLOW)  


 


Urine Appearance   (CLEAR)  


 


Urine pH   (5-6)  


 


Ur Specific Gravity   (1.005-1.025)  


 


POC Urine Protein Conf   (Negative)  


 


Urine Ketones   (NEGATIVE)  


 


Urine Nitrite   (NEGATIVE)  


 


Urine Bilirubin   (NEGATIVE)  


 


Urine Urobilinogen   (0-1)  mg/dL


 


Urine Leukocytes   (NEGATIVE)  


 


Urine WBC (Auto)   (0-5)  /HPF


 


Urine RBC (Auto)   (0-2)  /HPF


 


U Epithel Cells (Auto)   (FEW)  /HPF


 


Urine Bacteria (Auto)   (NEGATIVE)  /HPF


 


Urine RBC   (0-5)  Kevin/ul


 


Urine Mucus (Auto)   (NEGATIVE)  /HPF


 


Ur Culture Indicated?   


 


Urine Glucose   (NEGATIVE)  mg/dL














- Progress


Progress: unchanged


Discussed with : Marito (Dr. Jiménez will admit the patient here Pending 

transfer to [facility]), Other (We did speak with Dr. HAMMOND at [facility] his hematologist and and she encouraged us to restart the same 

antibiotics that he had in February and they will accept him in transfer when a 

bed is ready we were originally told that would be at least 2days we talked with

Dr. Alex rodas who agreed to admit him)





- Departure


Departure Disposition: Observation


Clinical Impression: 


 Chronic osteomyelitis involving lower leg





Condition: Fair


Critical Care Time: No


Referrals: 


JASVIR JAIMES MD [Primary Care Provider] - Follow up/PCP as directed

## 2022-07-13 LAB
ALBUMIN SERPL-MCNC: 2.2 G/DL (ref 3.5–5)
ALP SERPL-CCNC: 89 U/L (ref 38–126)
ALT SERPL-CCNC: 11 U/L (ref 0–50)
ANION GAP SERPL CALC-SCNC: 7.2 MEQ/L (ref 5–15)
AST SERPL QL: 29 U/L (ref 17–59)
BASOPHILS # BLD AUTO: 0.02 X10^3/UL (ref 0–0.4)
BILIRUB BLD-MCNC: 0.3 MG/DL (ref 0.2–1.3)
BLD SMEAR INTERP: YES
BUN SERPL-MCNC: 13 MG/DL (ref 9–20)
CALCIUM SPEC-MCNC: 7.9 MG/DL (ref 8.4–10.2)
CHLORIDE SERPL-SCNC: 109 MMOL/L (ref 98–107)
CO2 SERPL-SCNC: 27 MMOL/L (ref 22–30)
CREAT SERPL-MCNC: 0.7 MG/DL (ref 0.66–1.25)
EOSINOPHIL # BLD AUTO: 0.15 X10^3/UL (ref 0–0.5)
GFR SERPLBLD BASED ON 1.73 SQ M-ARVRAT: > 60 ML/MIN
GLUCOSE SERPL-MCNC: 77 MG/DL (ref 74–106)
HCT VFR BLD AUTO: 25.7 % (ref 42–50)
HGB BLD-MCNC: 8 G/DL (ref 12.5–18)
LYMPHOCYTES # SPEC AUTO: 0.97 X10^3/UL (ref 1–4.6)
MCH RBC QN AUTO: 30.7 PG (ref 26–32)
MCHC RBC AUTO-ENTMCNC: 31.1 G/DL (ref 32–36)
MONOCYTES # BLD AUTO: 0.35 X10^3/UL (ref 0–1.3)
PLATELET # BLD AUTO: 112 X10^3/UL (ref 150–450)
POTASSIUM SERPLBLD-SCNC: 3.4 MMOL/L (ref 3.5–5.1)
PROT SERPL-MCNC: 5.1 G/DL (ref 6.3–8.2)
RBC # BLD AUTO: 2.61 X10^6/UL (ref 4.1–5.6)
SODIUM SERPL-SCNC: 139 MMOL/L (ref 137–145)
WBC # BLD AUTO: 2.8 X10^3/UL (ref 4–10.5)

## 2022-07-13 RX ADMIN — CELECOXIB SCH MG: 100 CAPSULE ORAL at 21:03

## 2022-07-13 RX ADMIN — DEXTROSE MONOHYDRATE SCH MLS/HR: 50 INJECTION, SOLUTION INTRAVENOUS at 11:03

## 2022-07-13 RX ADMIN — CELECOXIB SCH MG: 100 CAPSULE ORAL at 10:26

## 2022-07-13 RX ADMIN — CEFEPIME HYDROCHLORIDE SCH MLS/HR: 2 INJECTION, POWDER, FOR SOLUTION INTRAVENOUS at 22:43

## 2022-07-13 RX ADMIN — DEXTROSE MONOHYDRATE SCH MLS/HR: 50 INJECTION, SOLUTION INTRAVENOUS at 00:49

## 2022-07-13 RX ADMIN — METRONIDAZOLE SCH MLS/HR: 500 INJECTION, SOLUTION INTRAVENOUS at 05:40

## 2022-07-13 RX ADMIN — PREGABALIN SCH MG: 50 CAPSULE ORAL at 21:03

## 2022-07-13 RX ADMIN — METRONIDAZOLE SCH MLS/HR: 500 INJECTION, SOLUTION INTRAVENOUS at 00:52

## 2022-07-13 RX ADMIN — METRONIDAZOLE SCH MLS/HR: 500 INJECTION, SOLUTION INTRAVENOUS at 23:46

## 2022-07-13 RX ADMIN — OXYCODONE HYDROCHLORIDE PRN MG: 5 TABLET ORAL at 21:26

## 2022-07-13 RX ADMIN — FAMOTIDINE SCH MG: 20 TABLET, FILM COATED ORAL at 10:27

## 2022-07-13 RX ADMIN — DOCUSATE SODIUM SCH MG: 100 CAPSULE, LIQUID FILLED ORAL at 10:27

## 2022-07-13 RX ADMIN — METRONIDAZOLE SCH MLS/HR: 500 INJECTION, SOLUTION INTRAVENOUS at 11:52

## 2022-07-13 RX ADMIN — GABAPENTIN SCH MG: 300 CAPSULE ORAL at 21:12

## 2022-07-13 RX ADMIN — METRONIDAZOLE SCH MLS/HR: 500 INJECTION, SOLUTION INTRAVENOUS at 17:50

## 2022-07-13 RX ADMIN — GABAPENTIN SCH MG: 300 CAPSULE ORAL at 10:28

## 2022-07-13 RX ADMIN — DEXTROSE MONOHYDRATE SCH: 50 INJECTION, SOLUTION INTRAVENOUS at 11:02

## 2022-07-13 RX ADMIN — GABAPENTIN SCH MG: 300 CAPSULE ORAL at 15:11

## 2022-07-13 RX ADMIN — FAMOTIDINE SCH MG: 20 TABLET, FILM COATED ORAL at 21:03

## 2022-07-13 RX ADMIN — PREGABALIN SCH MG: 50 CAPSULE ORAL at 10:27

## 2022-07-13 RX ADMIN — DEXTROSE MONOHYDRATE SCH MLS/HR: 50 INJECTION, SOLUTION INTRAVENOUS at 21:05

## 2022-07-13 RX ADMIN — DEXTROSE MONOHYDRATE SCH MLS/HR: 50 INJECTION, SOLUTION INTRAVENOUS at 02:41

## 2022-07-13 RX ADMIN — METHADONE HYDROCHLORIDE PRN MG: 10 TABLET ORAL at 10:27

## 2022-07-13 RX ADMIN — Medication SCH: at 11:03

## 2022-07-14 VITALS — SYSTOLIC BLOOD PRESSURE: 179 MMHG | HEART RATE: 84 BPM | DIASTOLIC BLOOD PRESSURE: 82 MMHG | OXYGEN SATURATION: 97 %

## 2022-07-14 RX ADMIN — CEFEPIME HYDROCHLORIDE SCH MLS/HR: 2 INJECTION, POWDER, FOR SOLUTION INTRAVENOUS at 10:13

## 2022-07-14 RX ADMIN — FAMOTIDINE SCH MG: 20 TABLET, FILM COATED ORAL at 10:11

## 2022-07-14 RX ADMIN — METHADONE HYDROCHLORIDE PRN MG: 10 TABLET ORAL at 16:49

## 2022-07-14 RX ADMIN — DOCUSATE SODIUM SCH MG: 100 CAPSULE, LIQUID FILLED ORAL at 10:11

## 2022-07-14 RX ADMIN — METRONIDAZOLE SCH MLS/HR: 500 INJECTION, SOLUTION INTRAVENOUS at 16:53

## 2022-07-14 RX ADMIN — Medication SCH TAB: at 10:14

## 2022-07-14 RX ADMIN — PREGABALIN SCH MG: 50 CAPSULE ORAL at 10:11

## 2022-07-14 RX ADMIN — GABAPENTIN SCH MG: 300 CAPSULE ORAL at 15:42

## 2022-07-14 RX ADMIN — METRONIDAZOLE SCH MLS/HR: 500 INJECTION, SOLUTION INTRAVENOUS at 05:06

## 2022-07-14 RX ADMIN — GABAPENTIN SCH MG: 300 CAPSULE ORAL at 10:12

## 2022-07-14 RX ADMIN — DEXTROSE MONOHYDRATE SCH MLS/HR: 50 INJECTION, SOLUTION INTRAVENOUS at 10:12

## 2022-07-14 RX ADMIN — METRONIDAZOLE SCH MLS/HR: 500 INJECTION, SOLUTION INTRAVENOUS at 12:14

## 2022-07-14 RX ADMIN — CELECOXIB SCH MG: 100 CAPSULE ORAL at 10:11

## 2022-07-14 NOTE — PCM.DS
Discharge Summary


Date of Admission: 


07/12/22 17:16





Admitting Physician: 


RENE HERMOSILLO





Primary Care Provider: 


JASVIR JAIMES MD








Allergies


Allergies





No Known Drug Allergies Allergy (Verified 07/12/22 17:54)


   











Hospital Summary





- Hospital Course


Hospital Course: 








                                 Chief Complaint





Diagnosis                        pain in leg right side





                                    Allergies











Allergy/AdvReac Type Severity Reaction Status Date / Time


 


No Known Drug Allergies Allergy   Verified 07/12/22 17:54








                           Vital Signs (Last 24 hours)











  Temp Pulse Resp BP Pulse Ox


 


 07/14/22 16:00  98.6 F  84  16  179/82  97


 


 07/14/22 12:00  97.4 F  62  16  173/93  96


 


 07/14/22 07:17  97.8 F  61  16  131/63  99


 


 07/14/22 04:00  97.1 F  55 L  16  136/60  98


 


 07/14/22 00:00  97.5 F  95 H  18  147/67  96


 


 07/13/22 20:00  97.5 F  50 L  16  141/64  98








                                Home Medications











 Medication  Instructions  Recorded  Confirmed  Last Taken  Type


 


Docusate Sodium 100 mg*** 100 mg PO DAILY 07/12/22 07/12/22 Unknown History





[Docusate Sodium 100 MG***]     


 


Famotidine 20 mg PO BID 07/12/22 07/12/22 Unknown History


 


Folic Acid/Vit B Complex and C 1 each PO DAILY 07/12/22 07/12/22 Unknown History





[B-Complex Plus Vitamin C Cplt]     


 


Gabapentin *** [Neurontin ***] 1 - 2 tab PO TID 07/12/22 07/12/22 Unknown 

History


 


Oxycodone HCl 15 mg PO Q4H PRN PRN 07/12/22 07/12/22 Unknown History


 


Pregabalin 50 mg*** [Lyrica 50 mg PO BID 07/12/22 07/12/22 Unknown History





50MG***]     


 


ondansetron HCL [Zofran] 4 mg PO DAILY 07/12/22 07/12/22 Unknown History








                               Current Medications











Generic Name Dose Route Start Last Admin





  Trade Name Freq  PRN Reason Stop Dose Admin


 


Celecoxib  200 mg  07/12/22 22:00  07/14/22 10:11





  Celecoxib 100 Mg Capsule  PO  08/11/22 21:59  200 mg





  BID ABNER   Administration


 


Docusate Sodium  100 mg  07/13/22 10:00  07/14/22 10:11





  Docusate Sodium 100 Mg Capsule  PO  08/12/22 09:59  100 mg





  DAILY ABNER   Administration


 


Famotidine  20 mg  07/13/22 10:00  07/14/22 10:11





  Famotidine 20 Mg Tablet  PO  08/12/22 09:59  20 mg





  BID ABNER   Administration


 


Gabapentin  300 - 600 mg  07/13/22 10:00  07/14/22 15:42





  Gabapentin 300 Mg Capsule  PO  08/12/22 09:59  300 mg





  TID ABNER   Administration


 


Sodium Chloride  1,000 mls @ 100 mls/hr  07/12/22 13:30  07/14/22 12:14





  Sodium Chloride 0.9% 1000 Ml  IV  08/11/22 13:29  100 mls/hr





  .Q10H ABNER   Administration


 


Metronidazole  500 mg in 100 mls @ 200 mls/hr  07/12/22 18:00  07/14/22 16:53





  Flagyl 500 Mg Ivpb  IV  08/11/22 17:59  200 mls/hr





  Q6HT ABNER   Administration


 


Vancomycin HCl 0.5 gm/ Sodium  100 mls @ 125 mls/hr  07/13/22 22:00  07/14/22 

10:13





  Chloride  IV  08/12/22 21:59  125 mls/hr





  Q12HT ABNER   Administration


 


Ceftazidime 2 gm/ Dextrose  100 mls @ 200 mls/hr  07/13/22 10:00  07/14/22 10:12





  IV  08/11/22 15:29  200 mls/hr





  Q12HT ABNER   Administration


 


Lorazepam  1 - 2 mg  07/13/22 07:13  07/13/22 21:04





  Lorazepam 1 Mg Tablet  PO  08/12/22 07:12  1 mg





  HS PRN PRN   Administration





  ANXIETY  


 


Methadone HCl  20 mg  07/13/22 07:14  07/14/22 16:49





  Methadone Hcl 10 Mg Tab  PO  07/18/22 07:13  20 mg





  TID PRN PRN   Administration





  PAIN  


 


Multivitamins  1 tab  07/13/22 10:00  07/14/22 10:14





  Vitamin B Complex With Vit. C Tablet  PO  08/12/22 09:59  1 tab





  DAILY ABNER   Administration


 


Ondansetron HCl  4 mg  07/12/22 15:33  07/13/22 20:55





  Ondansetron Hcl 4 Mg/2 Ml Vial  IV  08/11/22 15:32  4 mg





  Q6H PRN PRN   Administration





  NAUSEA/VOMITING  


 


Oxycodone HCl  15 mg  07/12/22 22:24  07/13/22 21:26





  Oxycodone Hcl 5 Mg Ir Tab***  PO  07/17/22 22:23  15 mg





  Q4H PRN PRN   Administration





  PAIN  


 


Recombinate-Factor 8  2,520 each  07/13/22 20:00  07/13/22 21:03





  (Viii)  IV  08/12/22 19:59  2,520 each





  Q24H PRN   Administration


 


Recombinate-Factor 8  2,520 each  07/15/22 08:00 





  (Viii)  IV  08/12/22 10:59 





  0800 ABNER  


 


Pregabalin  50 mg  07/12/22 22:00  07/14/22 10:11





  Pregabalin 50 Mg Capsule  PO  08/11/22 21:59  50 mg





  BID ABNER   Administration














Discontinued Medications














Generic Name Dose Route Start Last Admin





  Trade Name Freq  PRN Reason Stop Dose Admin


 


Bacitracin Zinc  Confirm  07/12/22 13:26 





  Bacitracin Packet 1 Each Pckt  Administered  07/12/22 13:27 





  Dose  





  10 each  





  .ROUTE  





  .STK-MED ONE  


 


Bacitracin Zinc  9 each  07/12/22 13:43  07/12/22 13:44





  Bacitracin Packet 1 Each Pckt  TP  07/12/22 13:44  9 each





  STAT ONE   Administration


 


Famotidine  20 mg  07/12/22 22:00  07/12/22 21:53





  Famotidine 20 Mg Tablet  PO  07/12/22 22:01  20 mg





  ONCE ONE   Administration


 


Gabapentin  300 mg  07/12/22 22:00  07/12/22 21:49





  Gabapentin 300 Mg Capsule  PO  08/11/22 21:59  300 mg





  BID ABNER   Administration


 


Vancomycin HCl  1 gm in 200 mls @ 125 mls/hr  07/12/22 15:17  07/12/22 16:03





  Vancomycin 1 Gram/200 Ml Bag  IV  07/12/22 16:52  125 mls/hr





  STAT ONE   125 mls/hr





    Administration


 


Metronidazole  500 mg in 100 mls @ 200 mls/hr  07/12/22 15:19  07/12/22 16:01





  Flagyl 500 Mg Ivpb  IV  07/12/22 15:48  Infused





  STAT STA   Infusion


 


Ceftazidime 2 gm/ Dextrose  100 mls @ 200 mls/hr  07/12/22 15:30  07/13/22 11:02





  IV  08/11/22 15:29  Not Given





  Q8HT ABNER  


 


Metronidazole  Confirm  07/12/22 15:23 





  Flagyl 500 Mg Ivpb  Administered  07/12/22 15:24 





  Dose  





  500 mg in 100 mls @ ud  





  IV  





  .STK-MED ONE  


 


Vancomycin HCl  Confirm  07/12/22 16:03 





  Vancomycin 1 Gram/200 Ml Bag  Administered  07/12/22 16:04 





  Dose  





  1 gm in 200 mls @ ud  





  IV  





  .STK-MED ONE  


 


Vancomycin HCl  1 gm in 200 mls @ 125 mls/hr  07/12/22 16:15  07/12/22 18:38





  Vancomycin 1 Gram/200 Ml Bag  IV  08/11/22 16:14  Not Given





  Q12H ABNER  


 


Vancomycin HCl 500 gm/ Sodium  250 mls @ 125 mls/hr  07/13/22 22:00 





  Chloride  IV  08/12/22 21:59 





  Q12HT ABNER  


 


Lorazepam  1 mg  07/12/22 22:00  07/12/22 21:49





  Lorazepam 1 Mg Tablet  PO  08/11/22 21:59  1 mg





  HS ABNER   Administration


 


Methadone HCl  20 mg  07/12/22 22:00  07/12/22 18:11





  Methadone Hcl 10 Mg Tab  PO  07/17/22 21:59  20 mg





  TID ABNER   Administration


 


Methadone HCl  Confirm  07/12/22 18:06 





  Methadone Hcl 10 Mg Tab  Administered  07/12/22 18:07 





  Dose  





  20 mg  





  .ROUTE  





  .STK-MED ONE  


 


Miscellaneous Medication  1 ea  07/12/22 22:00  07/12/22 20:26





  Miscellaneous Medication Order 1 Ea Each    07/12/22 22:01  1 ea





  NOW ONE   Administration


 


Patient Own Med  :  0 each  07/13/22 10:00  07/13/22 11:10





Recombinate-Factor 8  IV  08/12/22 09:59  Not Given





(Viii)  DAILY ABNER  


 


Recombinate-Factor 8  0 each  07/13/22 11:00  07/14/22 07:56





  (Viii)  IV  08/12/22 10:59  2,525 each





  0800 ABNER   Administration


 


Recombinate-Factor 8  0 each  07/13/22 20:00 





  (Viii)  IV  08/12/22 19:59 





  Q24H PRN  


 


Recombinate-Factor 8  1,248 each  07/14/22 12:45 





  (Viii)  IV  08/12/22 19:59 





  Q24H PRN  








                         Intake & Output (Last 24 hours)











 07/12/22 07/13/22 07/14/22 07/15/22





 11:59 11:59 11:59 11:59


 


Intake Total  1770 2052 720


 


Output Total  125 350 300


 


Balance  1645 1702 420


 


Weight  49.2 kg 51.7 kg 








                      Microbiology Results (Last 24 hours)





07/12/22 13:30   Blood   Blood Culture Gram Stain - Final


07/12/22 13:30   Blood   Blood Culture - Preliminary


                            Coagulase Negative Staph.


                            Possible Contaminant. Clinical judgement required.


                            NO FURTHER WORKUP WILL BE PERFORMED UNLESS PHYSICIAN


                            REQUESTED WITHIN THE NEXT 72 HOURS


07/12/22 13:25   Skin - Right Front   Wound Culture - Preliminary


                            ORGANISMS ISOLATED ARE CONSISTENT WITH NORMAL SKIN 

AMMON


                            LIGHT GROWTH, NO PREDOMINANT ORGANISM


07/12/22 13:58   Clean Catch Midstream   Urine Culture - Final


                            <10K NORMAL SKIN AMMON


                            PROBABLE SKIN CONTAMINANT





                             Orders (Last 24 hours)











 Category Date Time Status


 


 Miscellaneous Nursing Order ROUTINE Care  07/14/22 14:59 Active


 


 Discharge Routine Discharge  07/14/22 Ordered


 


 Discharge/Telephone Order Routine Discharge  07/14/22 15:02 Active


 


 Patient Own Med [Patient Own Medication] Med  07/14/22 12:45 Discontinued





 1,248 each IV Q24H PRN   


 


 Patient Own Med [Patient Own Medication] Med  07/15/22 08:00 Active





 2,520 each IV 0800   


 


 Patient Own Med [Patient Own Medication] Med  07/13/22 20:00 Active





 2,520 each IV Q24H PRN   


 


 Patient Own Med [Patient Own Medication] Med  07/13/22 20:00 Discontinued





 See Dose Instructions  IV Q24H PRN   


 


 Vancomycin HCl Inj*** [Vancocin Injection***] 0.5 gm Med  07/13/22 22:00 Active





 NaCl 0.9% 100 ml Mini-Bag Plus [Sodium Chloride 100ML   





 MINI-BAG PLUS] 100 ml   





 IV Q12HT   


 


 Vancomycin HCl Inj*** [Vancocin Injection***] 500 gm Med  07/13/22 22:00 

Discontinued





 NaCl 0.9% 250 ml [Sodium Chloride 0.9% 250 ML] 250 ml   





 IV Q12HT   








                       Patient Care Notes (Last 24 hours)





07/14/22 15:23 Nursing Note by Brionna Moran faxed patients D/C paperwork to [facility] [phone]. and let them know 

of his d/c to home plan for today [phone].





Initialized on 07/14/22 15:23 - END OF NOTE








07/14/22 15:21 Nursing Note by Brionna Moran faxed patients chart to [facility] [phone]. 





Initialized on 07/14/22 15:21 - END OF NOTE








07/14/22 14:29 Nursing Note by Norma Jaffe


Spoke with Dr Hermosillo and pt will be discharged this madisyn after his 1800 Flagyl 

dose. Spoke with pt and JUMANA Mac about DC. Emphasized that important 

to keep wound center appt tomorrow at [place] and discuss plan for continued 

anthibiotic therapy. Notified Jada at Dr Gray office about plan and to see if 

she wants to see him sooner than scheduled appt. Office will call back. Pt 

instructed to take major dose of Factor 8 med this madisyn and tomorrow as per Dr Gray and understanding verbalized.





Initialized on 07/14/22 14:29 - END OF NOTE








07/14/22 14:29 Case Management Note by Caty Donnelly


PATIENT FEELS CONFIDENT IN [facility] HOME THIS EVENING AND FOLLOWING UP WITH [facility] 

TOMORROW, HE REPORTS HE HAS ENOUGH HEMOPHILIA MEDS AT HOME FOR EXTRA DOSES AS 

INSTRUCTED. HE REPORTS HIS FRIEND AMERICO COULD STAY WITH HIM IF HE FELT HE 

NEEDED HIM TO BUT STATED HE FELT FINE BEING HOME ALONE. 





Initialized on 07/14/22 14:29 - END OF NOTE








07/14/22 13:59 Case Management Note by Caty Donnelly





Addendum entered by Caty Donnelly  07/14/22 14:34: 





FREDO NOTIFIED- WILL FAX RECORDS





Original Note:





[facility] WILL NEED ALL RECORDS FAXED TO THEM -[phone]. PATIENT 

HAS AN APPOINTMENT TOMORROW AT 1PM WITH THEM. THEY WILL NEED THIS INFORMATION 

FOR THAT APPOINTMENT





Initialized on 07/14/22 13:59 - END OF NOTE








07/14/22 13:47 Case Management Note by Caty Donnelly


S/W NURSE AT [facility]- DR. JAIMES AWARE PATIENT IS HERE- NO SPECIFIC 

RECOMMENDATIONS AS FAR AS CARE . PATIENT HAS AN APPOINTMENT WITH [facility] 

[facility] 7/15/22@ 1 PM





Initialized on 07/14/22 13:47 - END OF NOTE








07/14/22 13:08 Nursing Note by Norma Jaffe


Call back from Dr Gray office and order to give major dose Factor 8 med (same 

dose as AM dose) tonight and give med bid for 2 days





Initialized on 07/14/22 13:08 - END OF NOTE








07/14/22 12:54 Case Management Note by Caty Donnelly


S/W TAMARA AT [facility]- NOTIFIED WE ARE STILL WAITING ON BED AT [facility] CONTEMPLATING DC HOME SO HE CAN GET TO WOUND CENTER FOR 

FOLLOWUP. SHE VERIFIED UNDERSTANDING. SHE WILL S/W DR. JAIMES WHEN HE GETS IN. 

SHE WAS GIVEN DR. OSEI CELL NUMBER FOR DR. JAIMES TO CALL TO DISCUSS CASE





Initialized on 07/14/22 12:54 - END OF NOTE








07/14/22 12:51 Nursing Note by Norma Jaffe


Called Gabbi at Dr Gray office ([phone]) to request dose of Recombivant

Factor 8 if needed this madisyn.





Initialized on 07/14/22 12:51 - END OF NOTE








07/14/22 09:15 Case Management Note by Yuly Solis


SPOKE WITH ALVERTO, BED CONTROL, [facility].  REPORTS THAT NO BEDS AVAIL AT THIS 

TIME AND NO IDEA WHEN THEY WILL HAVE A BED OPEN UP. [phone].





Initialized on 07/14/22 09:15 - END OF NOTE








07/14/22 07:47 CNA Note by Carol Ann Strong


pt weighed this am and weight was off so zeroed the bed and reweighed pt.  pt 

weight this am is 51.7 kg   this cna reported the weight to nurse Damian Jaffe.







Initialized on 07/14/22 07:47 - END OF NOTE

















- Vitals & Intake/Output


Vital Signs: 





                                   Vital Signs











Temperature  98.6 F   07/14/22 16:00


 


Pulse Rate  84   07/14/22 16:00


 


Respiratory Rate  16   07/14/22 16:00


 


Blood Pressure  179/82   07/14/22 16:00


 


O2 Sat by Pulse Oximetry  97   07/14/22 16:00











Intake & Output: 





                                 Intake & Output











 07/12/22 07/13/22 07/14/22 07/15/22





 11:59 11:59 11:59 11:59


 


Intake Total  1770 2052 720


 


Output Total  125 350 300


 


Balance  1645 1702 420


 


Weight  49.2 kg 51.7 kg 














- Lab


Result Diagrams: 


                                 07/13/22 05:14





                                 07/13/22 05:14


Micro Results-Entire Visit: 





                                  Microbiology











 07/12/22 13:30 Blood Culture Gram Stain - Final





 Blood Blood Culture - Preliminary





    Coagulase Negative Staph.





    Possible Contaminant. Clinical judgement required.





    NO FURTHER WORKUP WILL BE PERFORMED UNLESS PHYSICIAN





    REQUESTED WITHIN THE NEXT 72 HOURS


 


 07/12/22 13:25 Wound Culture - Preliminary





 Skin - Right Front    ORGANISMS ISOLATED ARE CONSISTENT WITH NORMAL SKIN AMMON





    LIGHT GROWTH, NO PREDOMINANT ORGANISM


 


 07/12/22 13:58 Urine Culture - Final





 Clean Catch Midstream    <10K NORMAL SKIN AMMON





    PROBABLE SKIN CONTAMINANT














Discharge Exam


General Appearance: no apparent distress, alert


Neurologic Exam: alert, oriented x 3, cooperative, normal mood/affect, nml 

cerebellar function, sensation nml, No motor deficits


Eye Exam: PERRL, EOMI, eyes nml inspection


Ears, Nose, Throat Exam: normal ENT inspection, pharynx normal, moist mucous 

membranes


Neck Exam: normal inspection, non-tender, supple, full range of motion


Respiratory Exam: normal breath sounds, lungs clear, No respiratory distress


Cardiovascular Exam: regular rate/rhythm, normal heart sounds


Gastrointestinal/Abdomen Exam: soft, No tenderness, No mass


Male Genitalia Exam: deferred


Rectal Exam: deferred


Back Exam: normal inspection, normal range of motion, No CVA tenderness, No 

vertebral tenderness


Extremity Exam: normal inspection, normal range of motion


Skin Exam: normal color, warm, dry





Final Diagnosis/Problem List





- Final Discharge Diagnosis/Problem


(1) Chronic osteomyelitis involving lower leg


Current Visit: Yes   Status: Acute   


Assessment & Plan: 





                                 Chief Complaint





Diagnosis                        pain in leg right side





                                    Allergies











Allergy/AdvReac Type Severity Reaction Status Date / Time


 


No Known Drug Allergies Allergy   Verified 07/12/22 17:54








                           Vital Signs (Last 24 hours)











  Temp Pulse Resp BP Pulse Ox


 


 07/14/22 16:00  98.6 F  84  16  179/82  97


 


 07/14/22 12:00  97.4 F  62  16  173/93  96


 


 07/14/22 07:17  97.8 F  61  16  131/63  99


 


 07/14/22 04:00  97.1 F  55 L  16  136/60  98


 


 07/14/22 00:00  97.5 F  95 H  18  147/67  96


 


 07/13/22 20:00  97.5 F  50 L  16  141/64  98








                                Home Medications











 Medication  Instructions  Recorded  Confirmed  Last Taken  Type


 


Docusate Sodium 100 mg*** 100 mg PO DAILY 07/12/22 07/12/22 Unknown History





[Docusate Sodium 100 MG***]     


 


Famotidine 20 mg PO BID 07/12/22 07/12/22 Unknown History


 


Folic Acid/Vit B Complex and C 1 each PO DAILY 07/12/22 07/12/22 Unknown History





[B-Complex Plus Vitamin C Cplt]     


 


Gabapentin *** [Neurontin ***] 1 - 2 tab PO TID 07/12/22 07/12/22 Unknown 

History


 


Oxycodone HCl 15 mg PO Q4H PRN PRN 07/12/22 07/12/22 Unknown History


 


Pregabalin 50 mg*** [Lyrica 50 mg PO BID 07/12/22 07/12/22 Unknown History





50MG***]     


 


ondansetron HCL [Zofran] 4 mg PO DAILY 07/12/22 07/12/22 Unknown History








                               Current Medications











Generic Name Dose Route Start Last Admin





  Trade Name Freq  PRN Reason Stop Dose Admin


 


Celecoxib  200 mg  07/12/22 22:00  07/14/22 10:11





  Celecoxib 100 Mg Capsule  PO  08/11/22 21:59  200 mg





  BID ABNER   Administration


 


Docusate Sodium  100 mg  07/13/22 10:00  07/14/22 10:11





  Docusate Sodium 100 Mg Capsule  PO  08/12/22 09:59  100 mg





  DAILY ABNER   Administration


 


Famotidine  20 mg  07/13/22 10:00  07/14/22 10:11





  Famotidine 20 Mg Tablet  PO  08/12/22 09:59  20 mg





  BID ABNER   Administration


 


Gabapentin  300 - 600 mg  07/13/22 10:00  07/14/22 15:42





  Gabapentin 300 Mg Capsule  PO  08/12/22 09:59  300 mg





  TID ABNER   Administration


 


Sodium Chloride  1,000 mls @ 100 mls/hr  07/12/22 13:30  07/14/22 12:14





  Sodium Chloride 0.9% 1000 Ml  IV  08/11/22 13:29  100 mls/hr





  .Q10H ABNER   Administration


 


Metronidazole  500 mg in 100 mls @ 200 mls/hr  07/12/22 18:00  07/14/22 16:53





  Flagyl 500 Mg Ivpb  IV  08/11/22 17:59  200 mls/hr





  Q6HT ABNER   Administration


 


Vancomycin HCl 0.5 gm/ Sodium  100 mls @ 125 mls/hr  07/13/22 22:00  07/14/22 

10:13





  Chloride  IV  08/12/22 21:59  125 mls/hr





  Q12HT ABNER   Administration


 


Ceftazidime 2 gm/ Dextrose  100 mls @ 200 mls/hr  07/13/22 10:00  07/14/22 10:12





  IV  08/11/22 15:29  200 mls/hr





  Q12HT ABNER   Administration


 


Lorazepam  1 - 2 mg  07/13/22 07:13  07/13/22 21:04





  Lorazepam 1 Mg Tablet  PO  08/12/22 07:12  1 mg





  HS PRN PRN   Administration





  ANXIETY  


 


Methadone HCl  20 mg  07/13/22 07:14  07/14/22 16:49





  Methadone Hcl 10 Mg Tab  PO  07/18/22 07:13  20 mg





  TID PRN PRN   Administration





  PAIN  


 


Multivitamins  1 tab  07/13/22 10:00  07/14/22 10:14





  Vitamin B Complex With Vit. C Tablet  PO  08/12/22 09:59  1 tab





  DAILY ABNER   Administration


 


Ondansetron HCl  4 mg  07/12/22 15:33  07/13/22 20:55





  Ondansetron Hcl 4 Mg/2 Ml Vial  IV  08/11/22 15:32  4 mg





  Q6H PRN PRN   Administration





  NAUSEA/VOMITING  


 


Oxycodone HCl  15 mg  07/12/22 22:24  07/13/22 21:26





  Oxycodone Hcl 5 Mg Ir Tab***  PO  07/17/22 22:23  15 mg





  Q4H PRN PRN   Administration





  PAIN  


 


Recombinate-Factor 8  2,520 each  07/13/22 20:00  07/13/22 21:03





  (Viii)  IV  08/12/22 19:59  2,520 each





  Q24H PRN   Administration


 


Recombinate-Factor 8  2,520 each  07/15/22 08:00 





  (Viii)  IV  08/12/22 10:59 





  0800 ABNER  


 


Pregabalin  50 mg  07/12/22 22:00  07/14/22 10:11





  Pregabalin 50 Mg Capsule  PO  08/11/22 21:59  50 mg





  BID ABNER   Administration














Discontinued Medications














Generic Name Dose Route Start Last Admin





  Trade Name Griselda  PRN Reason Stop Dose Admin


 


Bacitracin Zinc  Confirm  07/12/22 13:26 





  Bacitracin Packet 1 Each Pckt  Administered  07/12/22 13:27 





  Dose  





  10 each  





  .ROUTE  





  .STK-MED ONE  


 


Bacitracin Zinc  9 each  07/12/22 13:43  07/12/22 13:44





  Bacitracin Packet 1 Each Pckt  TP  07/12/22 13:44  9 each





  STAT ONE   Administration


 


Famotidine  20 mg  07/12/22 22:00  07/12/22 21:53





  Famotidine 20 Mg Tablet  PO  07/12/22 22:01  20 mg





  ONCE ONE   Administration


 


Gabapentin  300 mg  07/12/22 22:00  07/12/22 21:49





  Gabapentin 300 Mg Capsule  PO  08/11/22 21:59  300 mg





  BID ABNER   Administration


 


Vancomycin HCl  1 gm in 200 mls @ 125 mls/hr  07/12/22 15:17  07/12/22 16:03





  Vancomycin 1 Gram/200 Ml Bag  IV  07/12/22 16:52  125 mls/hr





  STAT ONE   125 mls/hr





    Administration


 


Metronidazole  500 mg in 100 mls @ 200 mls/hr  07/12/22 15:19  07/12/22 16:01





  Flagyl 500 Mg Ivpb  IV  07/12/22 15:48  Infused





  STAT STA   Infusion


 


Ceftazidime 2 gm/ Dextrose  100 mls @ 200 mls/hr  07/12/22 15:30  07/13/22 11:02





  IV  08/11/22 15:29  Not Given





  Q8HT [facility]  


 


Metronidazole  Confirm  07/12/22 15:23 





  Flagyl 500 Mg Ivpb  Administered  07/12/22 15:24 





  Dose  





  500 mg in 100 mls @ ud  





  IV  





  .STK-MED ONE  


 


Vancomycin HCl  Confirm  07/12/22 16:03 





  Vancomycin 1 Gram/200 Ml Bag  Administered  07/12/22 16:04 





  Dose  





  1 gm in 200 mls @ ud  





  IV  





  .STK-MED ONE  


 


Vancomycin HCl  1 gm in 200 mls @ 125 mls/hr  07/12/22 16:15  07/12/22 18:38





  Vancomycin 1 Gram/200 Ml Bag  IV  08/11/22 16:14  Not Given





  Q12H ABNER  


 


Vancomycin HCl 500 gm/ Sodium  250 mls @ 125 mls/hr  07/13/22 22:00 





  Chloride  IV  08/12/22 21:59 





  Q12HT ABNER  


 


Lorazepam  1 mg  07/12/22 22:00  07/12/22 21:49





  Lorazepam 1 Mg Tablet  PO  08/11/22 21:59  1 mg





  HS ABNER   Administration


 


Methadone HCl  20 mg  07/12/22 22:00  07/12/22 18:11





  Methadone Hcl 10 Mg Tab  PO  07/17/22 21:59  20 mg





  TID ABNER   Administration


 


Methadone HCl  Confirm  07/12/22 18:06 





  Methadone Hcl 10 Mg Tab  Administered  07/12/22 18:07 





  Dose  





  20 mg  





  .ROUTE  





  .STK-MED ONE  


 


Miscellaneous Medication  1 ea  07/12/22 22:00  07/12/22 20:26





  Miscellaneous Medication Order 1 Ea Each  MC  07/12/22 22:01  1 ea





  NOW ONE   Administration


 


Patient Own Med  :  0 each  07/13/22 10:00  07/13/22 11:10





Recombinate-Factor 8  IV  08/12/22 09:59  Not Given





(Viii)  DAILY ABNER  


 


Recombinate-Factor 8  0 each  07/13/22 11:00  07/14/22 07:56





  (Viii)  IV  08/12/22 10:59  2,525 each





  0800 ABNER   Administration


 


Recombinate-Factor 8  0 each  07/13/22 20:00 





  (Viii)  IV  08/12/22 19:59 





  Q24H PRN  


 


Recombinate-Factor 8  1,248 each  07/14/22 12:45 





  (Viii)  IV  08/12/22 19:59 





  Q24H PRN  








                         Intake & Output (Last 24 hours)











 07/12/22 07/13/22 07/14/22 07/15/22





 11:59 11:59 11:59 11:59


 


Intake Total  1770 2052 720


 


Output Total  125 350 300


 


Balance  1645 1702 420


 


Weight  49.2 kg 51.7 kg 








                      Microbiology Results (Last 24 hours)





07/12/22 13:30   Blood   Blood Culture Gram Stain - Final


07/12/22 13:30   Blood   Blood Culture - Preliminary


                            Coagulase Negative Staph.


                            Possible Contaminant. Clinical judgement required.


                            NO FURTHER WORKUP WILL BE PERFORMED UNLESS PHYSICIAN


                            REQUESTED WITHIN THE NEXT 72 HOURS


07/12/22 13:25   Skin - Right Front   Wound Culture - Preliminary


                            ORGANISMS ISOLATED ARE CONSISTENT WITH NORMAL SKIN 

AMMON


                            LIGHT GROWTH, NO PREDOMINANT ORGANISM


07/12/22 13:58   Clean Catch Midstream   Urine Culture - Final


                            <10K NORMAL SKIN AMMON


                            PROBABLE SKIN CONTAMINANT





                             Orders (Last 24 hours)











 Category Date Time Status


 


 Miscellaneous Nursing Order ROUTINE Care  07/14/22 14:59 Active


 


 Discharge Routine Discharge  07/14/22 Ordered


 


 Discharge/Telephone Order Routine Discharge  07/14/22 15:02 Active


 


 Patient Own Med [Patient Own Medication] Med  07/14/22 12:45 Discontinued





 1,248 each IV Q24H PRN   


 


 Patient Own Med [Patient Own Medication] Med  07/15/22 08:00 Active





 2,520 each IV 0800   


 


 Patient Own Med [Patient Own Medication] Med  07/13/22 20:00 Active





 2,520 each IV Q24H PRN   


 


 Patient Own Med [Patient Own Medication] Med  07/13/22 20:00 Discontinued





 See Dose Instructions  IV Q24H PRN   


 


 Vancomycin HCl Inj*** [Vancocin Injection***] 0.5 gm Med  07/13/22 22:00 Active





 NaCl 0.9% 100 ml Mini-Bag Plus [Sodium Chloride 100ML   





 MINI-BAG PLUS] 100 ml   





 IV Q12HT   


 


 Vancomycin HCl Inj*** [Vancocin Injection***] 500 gm Med  07/13/22 22:00 

Discontinued





 NaCl 0.9% 250 ml [Sodium Chloride 0.9% 250 ML] 250 ml   





 IV Q12HT   








                       Patient Care Notes (Last 24 hours)





07/14/22 15:23 Nursing Note by Brionna Moran faxed patients D/C paperwork to [facility] [phone]. and let them know 

of his d/c to home plan for today [phone].





Initialized on 07/14/22 15:23 - END OF NOTE








07/14/22 15:21 Nursing Note by Brionna Moran faxed patients chart to [facility] [phone]. 





Initialized on 07/14/22 15:21 - END OF NOTE








07/14/22 14:29 Nursing Note by Norma Jaffe


Spoke with Dr Hermosillo and pt will be discharged this madisyn after his 1800 Flagyl 

dose. Spoke with pt and JUMANA Mac about DC. Emphasized that important 

to keep wound center appt tomorrow at [facility] and discuss plan for continued 

anthibiotic therapy. Notified Jada at Dr Gray office about plan and to see if 

she wants to see him sooner than scheduled appt. Office will call back. Pt 

instructed to take major dose of Factor 8 med this madisyn and tomorrow as per Dr Gray and understanding verbalized.





Initialized on 07/14/22 14:29 - END OF NOTE








07/14/22 14:29 Case Management Note by Caty Donnelly


PATIENT FEELS CONFIDENT IN [facility] HOME THIS EVENING AND FOLLOWING UP WITH [facility] 

TOMORROW, HE REPORTS HE HAS ENOUGH HEMOPHILIA MEDS AT HOME FOR EXTRA DOSES AS 

INSTRUCTED. HE REPORTS HIS FRIEND AMERICO COULD STAY WITH HIM IF HE FELT HE 

NEEDED HIM TO BUT STATED HE FELT FINE BEING HOME ALONE. 





Initialized on 07/14/22 14:29 - END OF NOTE








07/14/22 13:59 Case Management Note by Caty Donnelly





Addendum entered by Caty Donnelly  07/14/22 14:34: 





[facility] NOTIFIED- WILL FAX RECORDS





Original Note:





[facility] WILL NEED ALL RECORDS FAXED TO THEM -[phone]. PATIENT 

HAS AN APPOINTMENT TOMORROW AT 1PM WITH THEM. THEY WILL NEED THIS INFORMATION 

FOR THAT APPOINTMENT





Initialized on 07/14/22 13:59 - END OF NOTE








07/14/22 13:47 Case Management Note by Caty Donnelly


S/W NURSE AT [facility]- DR. JAIMES AWARE PATIENT IS HERE- NO SPECIFIC 

RECOMMENDATIONS AS FAR AS CARE . PATIENT HAS AN APPOINTMENT WITH [facility] 7/15/22@ 1 PM





Initialized on 07/14/22 13:47 - END OF NOTE








07/14/22 13:08 Nursing Note by Norma Jaffe


Call back from Dr Gray office and order to give major dose Factor 8 med (same 

dose as AM dose) tonight and give med bid for 2 days





Initialized on 07/14/22 13:08 - END OF NOTE








07/14/22 12:54 Case Management Note by Caty Donnelly


S/W TAMARA AT [facility]- NOTIFIED WE ARE STILL WAITING ON BED AT [facility] CONTEMPLATING DC HOME SO HE CAN GET TO WOUND CENTER FOR FOLL

OWUP. SHE VERIFIED UNDERSTANDING. SHE WILL S/W DR. JAIMES WHEN HE GETS IN. SHE 

WAS GIVEN DR. OSEI CELL NUMBER FOR DR. JAIMES TO CALL TO DISCUSS CASE





Initialized on 07/14/22 12:54 - END OF NOTE








07/14/22 12:51 Nursing Note by Norma Jaffe


Called Gabbi at Dr Gray office ([phone]) to request dose of Recombivant

Factor 8 if needed this madisyn.





Initialized on 07/14/22 12:51 - END OF NOTE








07/14/22 09:15 Case Management Note by Yuly Solis


SPOKE WITH ALVERTO, BED CONTROL, [facility].  REPORTS THAT NO BEDS AVAIL AT THIS 

TIME AND NO IDEA WHEN THEY WILL HAVE A BED OPEN UP. [phone].





Initialized on 07/14/22 09:15 - END OF NOTE








07/14/22 07:47 CNA Note by Carol Ann Strong


pt weighed this am and weight was off so zeroed the bed and reweighed pt.  pt 

weight this am is 51.7 kg   this cna reported the weight to nurse Damian Jaffe.







Initialized on 07/14/22 07:47 - END OF NOTE














Code(s): M86.669 - OTHER CHRONIC OSTEOMYELITIS, UNSPECIFIED TIBIA AND FIBULA   





- Discharge


Discharge Date: 07/14/22


Disposition: Home, Self-Care


Condition: Stable


Prescriptions: 


Continue


   Lorazepam 1 mg*** [Ativan 1 MG***] 1 - 2 mg PO HS PRN


     PRN Reason: Anxiety


   Methadone HCl 10 mg*** [DOLOPHINE 10MG Tablet***] 2 tab PO TID PRN PRN


     PRN Reason: Pain


   Factor XIII [Corifact] 3,000 units IV BID


   Celecoxib [Celebrex] 200 mg PO BID


   Pregabalin 50 mg*** [Lyrica 50MG***] 50 mg PO BID


   Gabapentin *** [Neurontin ***] 1 - 2 tab PO TID


   Oxycodone HCl 15 mg PO Q4H PRN PRN


     PRN Reason: Pain


   Famotidine 20 mg PO BID


   Folic Acid/Vit B Complex and C [B-Complex Plus Vitamin C Cplt] 1 each PO 

DAILY


   Docusate Sodium 100 mg*** [Docusate Sodium 100 MG***] 100 mg PO DAILY


   ondansetron HCL [Zofran] 4 mg PO DAILY


Instructions:  Osteomyelitis (DC)


Additional Instructions: 


Keep 7/15 [facility] appt and discus plan for continued antibiotic 

therapy.


Take major dose Factor 8 med this evening and tomorrow evening in addition to 

reg scheduled dose per Dr Greist office. Follow up with  July 25,2022 T 

2:30 PM


Follow up with: 


LETI GRAY [NON-STAFF PHY W/O PRIVILEGES] - Call for Appointment


JASVIR JAIMES MD [Primary Care Provider] - 07/15/22 1:00 pm


Forms:  Discharge Instructions

## 2022-07-14 NOTE — PCM.NOTE
Date and Time: 07/14/22  0752





Subjective Assessment: 





doing ok





- Review of Systems


Constitutional: No Fever, No Chills


Eyes: No Symptoms


Ears, Nose, & Throat: No Symptoms


Respiratory: No Cough, No Short Of Breath


Cardiac: No Chest Pain, No Edema, No Syncope


Abdominal/Gastrointestinal: No Abdominal Pain, No Nausea, No Vomiting, No 

Diarrhea


Genitourinary Symptoms: No Dysuria


Musculoskeletal: No Back Pain, No Neck Pain


Skin: No Rash


Neurological: No Dizziness, No Focal Weakness, No Sensory Changes


Psychological: No Symptoms


Endocrine: No Symptoms


Hematologic/Lymphatic: No Symptoms


Immunological/Allergic: No Symptoms





Objective Exam


General Appearance: no apparent distress, alert


Neurologic Exam: alert, oriented x 3, cooperative, normal mood/affect, nml 

cerebellar function, sensation nml, No motor deficits


Skin Exam: normal color, warm, dry


Eye Exam: PERRL, EOMI, eyes nml inspection


Ears, Nose, Throat Exam: normal ENT inspection, pharynx normal, moist mucous 

membranes


Neck Exam: normal inspection, non-tender, supple, full range of motion


Respiratory Exam: normal breath sounds, lungs clear, No respiratory distress


Cardiovascular Exam: regular rate/rhythm, normal heart sounds


Gastrointestinal/Abdomen Exam: soft, No tenderness, No mass


Extremity Exam: normal inspection, normal range of motion


Back Exam: normal inspection, normal range of motion, No CVA tenderness, No 

vertebral tenderness


Male Genitalia Exam: deferred


Rectal Exam: deferred





OBJECTIVE DATA


Vital Signs: 


                               Vital Signs - 24 hr











  Temp Pulse Resp BP Pulse Ox


 


 07/14/22 07:17  97.8 F  61  16  131/63  99


 


 07/14/22 04:00  97.1 F  55 L  16  136/60  98


 


 07/14/22 00:00  97.5 F  95 H  18  147/67  96


 


 07/13/22 20:00  97.5 F  50 L  16  141/64  98


 


 07/13/22 16:00  96.6 F  68  16  125/61  97


 


 07/13/22 12:00  97.8 F  68  16  162/75  97


 


 07/13/22 08:00  97.0 F  57 L  16  128/60  98








                        Pain Assessment - Last Documented











Pain Intensity                 0


 


Pain Scale Used                0-10 Pain Scale











Intake and Output: 


                                 Intake & Output











 07/11/22 07/12/22 07/13/22 07/14/22





 11:59 11:59 11:59 11:59


 


Intake Total   1770 1812


 


Output Total   125 350


 


Balance   1645 1462


 


Weight   49.2 kg 51.7 kg











Radiology Exams: 


                              Radiology Procedures











 Category Date Time Status


 


 CHEST 1 VIEW (PORTABLE) Stat Exams  07/12/22 13:23 Completed


 


 KNEE (1 OR 2 VIEW) Stat Exams  07/12/22 13:23 Completed











Multi-Disciplinary Progress Notes: 


                        Multi-Disciplinary Progress Notes





07/13/22 12:36 Case Management Note by Caty Donnelly


WILL HOLD DC PLANNING AT THIS TIME- PATIENT WAITING ON A BED AT [facility]- 

HOPEFULLY WILL HAVE A BED LATER TODAY





Initialized on 07/13/22 12:36 - END OF NOTE








07/13/22 12:30 Case Management Note by Caty Donnelly


PATIENT HAS [facility]. THEY WERE NOTIFIED PATIENT IS HERE AS OBS AND IS WAITI

NG ON BED AT [facility]. THEY VERIFIED UNDERSTANDING.


IF PLANS WOULD CHANGE AND PATIENT WOULD DC HOME FROM HERE- [facility] NEEDS TO BE 

NOTIFIED -[phone]. THEY NEED FAXED THE DC INSTRUCTIONS, DC MED LIST AND 

DC SUMMARY ( IF AVAILABLE) -[phone]





Initialized on 07/13/22 12:30 - END OF NOTE

















Assessment/Plan


(1) Chronic osteomyelitis involving lower leg


Current Visit: Yes   Status: Acute   


Qualifiers: 


   Laterality: right   Qualified Code(s): M86.661 - Other chronic osteomyelitis,

right tibia and fibula   


Assessment & Plan: 


                                 Chief Complaint





Diagnosis                        pain in leg right side





                                    Allergies











Allergy/AdvReac Type Severity Reaction Status Date / Time


 


No Known Drug Allergies Allergy   Verified 07/12/22 17:54








                           Vital Signs (Last 24 hours)











  Temp Pulse Resp BP Pulse Ox


 


 07/14/22 07:17  97.8 F  61  16  131/63  99


 


 07/14/22 04:00  97.1 F  55 L  16  136/60  98


 


 07/14/22 00:00  97.5 F  95 H  18  147/67  96


 


 07/13/22 20:00  97.5 F  50 L  16  141/64  98


 


 07/13/22 16:00  96.6 F  68  16  125/61  97


 


 07/13/22 12:00  97.8 F  68  16  162/75  97


 


 07/13/22 08:00  97.0 F  57 L  16  128/60  98








                                Home Medications











 Medication  Instructions  Recorded  Confirmed  Last Taken  Type


 


Docusate Sodium 100 mg*** 100 mg PO DAILY 07/12/22 07/12/22 Unknown History





[Docusate Sodium 100 MG***]     


 


Famotidine 20 mg PO BID 07/12/22 07/12/22 Unknown History


 


Folic Acid/Vit B Complex and C 1 each PO DAILY 07/12/22 07/12/22 Unknown History





[B-Complex Plus Vitamin C]     


 


Gabapentin *** [Neurontin ***] 1 - 2 tab PO TID 07/12/22 07/12/22 Unknown 

History


 


Oxycodone HCl 15 mg PO Q4H PRN PRN 07/12/22 07/12/22 Unknown History


 


Pregabalin 50 mg*** [Lyrica 50 mg PO BID 07/12/22 07/12/22 Unknown History





50MG***]     


 


ondansetron HCL [Zofran] 4 mg PO DAILY 07/12/22 07/12/22 Unknown History








                               Current Medications











Generic Name Dose Route Start Last Admin





  Trade Name Freq  PRN Reason Stop Dose Admin


 


Celecoxib  200 mg  07/12/22 22:00  07/13/22 21:03





  Celecoxib 100 Mg Capsule  PO  08/11/22 21:59  200 mg





  BID ABNER   Administration


 


Docusate Sodium  100 mg  07/13/22 10:00  07/13/22 10:27





  Docusate Sodium 100 Mg Capsule  PO  08/12/22 09:59  100 mg





  DAILY ABNER   Administration


 


Famotidine  20 mg  07/13/22 10:00  07/13/22 21:03





  Famotidine 20 Mg Tablet  PO  08/12/22 09:59  20 mg





  BID ABNER   Administration


 


Gabapentin  300 - 600 mg  07/13/22 10:00  07/13/22 21:12





  Gabapentin 300 Mg Capsule  PO  08/12/22 09:59  300 mg





  TID ABNER   Administration


 


Sodium Chloride  1,000 mls @ 100 mls/hr  07/12/22 13:30  07/13/22 23:45





  Sodium Chloride 0.9% 1000 Ml  IV  08/11/22 13:29  100 mls/hr





  .Q10H ABNER   Administration


 


Metronidazole  500 mg in 100 mls @ 200 mls/hr  07/12/22 18:00  07/14/22 05:06





  Flagyl 500 Mg Ivpb  IV  08/11/22 17:59  200 mls/hr





  Q6HT ABNER   Administration


 


Vancomycin HCl 0.5 gm/ Sodium  100 mls @ 125 mls/hr  07/13/22 22:00  07/13/22 

22:43





  Chloride  IV  08/12/22 21:59  125 mls/hr





  Q12HT ABNER   Administration


 


Ceftazidime 2 gm/ Dextrose  100 mls @ 200 mls/hr  07/13/22 10:00  07/13/22 21:05





  IV  08/11/22 15:29  200 mls/hr





  Q12HT ABNER   Administration


 


Lorazepam  1 - 2 mg  07/13/22 07:13  07/13/22 21:04





  Lorazepam 1 Mg Tablet  PO  08/12/22 07:12  1 mg





  HS PRN PRN   Administration





  ANXIETY  


 


Methadone HCl  20 mg  07/13/22 07:14  07/13/22 10:27





  Methadone Hcl 10 Mg Tab  PO  07/18/22 07:13  20 mg





  TID PRN PRN   Administration





  PAIN  


 


Multivitamins  1 tab  07/13/22 10:00  07/13/22 11:03





  Vitamin B Complex With Vit. C Tablet  PO  08/12/22 09:59  Not Given





  DAILY ABNER  


 


Ondansetron HCl  4 mg  07/12/22 15:33  07/13/22 20:55





  Ondansetron Hcl 4 Mg/2 Ml Vial  IV  08/11/22 15:32  4 mg





  Q6H PRN PRN   Administration





  NAUSEA/VOMITING  


 


Oxycodone HCl  15 mg  07/12/22 22:24  07/13/22 21:26





  Oxycodone Hcl 5 Mg Ir Tab***  PO  07/17/22 22:23  15 mg





  Q4H PRN PRN   Administration





  PAIN  


 


Recombinate-Factor 8  0 each  07/13/22 11:00  07/13/22 10:21





  (Viii)  IV  08/12/22 10:59  2,520 each





  0800 ABNER   Administration


 


Recombinate-Factor 8  0 each  07/13/22 20:00 





  (Viii)  IV  08/12/22 19:59 





  Q24H PRN  


 


Recombinate-Factor 8  2,520 each  07/13/22 20:00  07/13/22 21:03





  (Viii)  IV  08/12/22 19:59  2,520 each





  Q24H PRN   Administration


 


Pregabalin  50 mg  07/12/22 22:00  07/13/22 21:03





  Pregabalin 50 Mg Capsule  PO  08/11/22 21:59  50 mg





  BID ABNER   Administration














Discontinued Medications














Generic Name Dose Route Start Last Admin





  Trade Name Griselda  PRN Reason Stop Dose Admin


 


Bacitracin Zinc  Confirm  07/12/22 13:26 





  Bacitracin Packet 1 Each Pckt  Administered  07/12/22 13:27 





  Dose  





  10 each  





  .ROUTE  





  .STK-MED ONE  


 


Bacitracin Zinc  9 each  07/12/22 13:43  07/12/22 13:44





  Bacitracin Packet 1 Each Pckt  TP  07/12/22 13:44  9 each





  STAT ONE   Administration


 


Famotidine  20 mg  07/12/22 22:00  07/12/22 21:53





  Famotidine 20 Mg Tablet  PO  07/12/22 22:01  20 mg





  ONCE ONE   Administration


 


Gabapentin  300 mg  07/12/22 22:00  07/12/22 21:49





  Gabapentin 300 Mg Capsule  PO  08/11/22 21:59  300 mg





  BID ABNER   Administration


 


Vancomycin HCl  1 gm in 200 mls @ 125 mls/hr  07/12/22 15:17  07/12/22 16:03





  Vancomycin 1 Gram/200 Ml Bag  IV  07/12/22 16:52  125 mls/hr





  STAT ONE   125 mls/hr





    Administration


 


Metronidazole  500 mg in 100 mls @ 200 mls/hr  07/12/22 15:19  07/12/22 16:01





  Flagyl 500 Mg Ivpb  IV  07/12/22 15:48  Infused





  STAT STA   Infusion


 


Ceftazidime 2 gm/ Dextrose  100 mls @ 200 mls/hr  07/12/22 15:30  07/13/22 11:02





  IV  08/11/22 15:29  Not Given





  Q8HT ABNER  


 


Metronidazole  Confirm  07/12/22 15:23 





  Flagyl 500 Mg Ivpb  Administered  07/12/22 15:24 





  Dose  





  500 mg in 100 mls @ ud  





  IV  





  .STK-MED ONE  


 


Vancomycin HCl  Confirm  07/12/22 16:03 





  Vancomycin 1 Gram/200 Ml Bag  Administered  07/12/22 16:04 





  Dose  





  1 gm in 200 mls @ ud  





  IV  





  .STK-MED ONE  


 


Vancomycin HCl  1 gm in 200 mls @ 125 mls/hr  07/12/22 16:15  07/12/22 18:38





  Vancomycin 1 Gram/200 Ml Bag  IV  08/11/22 16:14  Not Given





  Q12H ABNER  


 


Vancomycin HCl 500 gm/ Sodium  250 mls @ 125 mls/hr  07/13/22 22:00 





  Chloride  IV  08/12/22 21:59 





  Q12HT ABNER  


 


Lorazepam  1 mg  07/12/22 22:00  07/12/22 21:49





  Lorazepam 1 Mg Tablet  PO  08/11/22 21:59  1 mg





  HS ABNER   Administration


 


Methadone HCl  20 mg  07/12/22 22:00  07/12/22 18:11





  Methadone Hcl 10 Mg Tab  PO  07/17/22 21:59  20 mg





  TID ABNER   Administration


 


Methadone HCl  Confirm  07/12/22 18:06 





  Methadone Hcl 10 Mg Tab  Administered  07/12/22 18:07 





  Dose  





  20 mg  





  .ROUTE  





  .STK-MED ONE  


 


Miscellaneous Medication  1 ea  07/12/22 22:00  07/12/22 20:26





  Miscellaneous Medication Order 1 Ea Each  MC  07/12/22 22:01  1 ea





  NOW ONE   Administration


 


Patient Own Med  :  0 each  07/13/22 10:00  07/13/22 11:10





Recombinate-Factor 8  IV  08/12/22 09:59  Not Given





(Viii)  DAILY ABNER  








                         Intake & Output (Last 24 hours)











 07/11/22 07/12/22 07/13/22 07/14/22





 11:59 11:59 11:59 11:59


 


Intake Total   1770 1812


 


Output Total   125 350


 


Balance   1645 1462


 


Weight   49.2 kg 51.7 kg








                      Microbiology Results (Last 24 hours)





07/12/22 13:58   Clean Catch Midstream   Urine Culture - Preliminary


                            NO GROWTH TO DATE


07/12/22 13:30   Blood   Blood Culture Gram Stain - Final


07/12/22 13:30   Blood   Blood Culture - Pending





                             Orders (Last 24 hours)











 Category Date Time Status


 


 Ceftazidime Pentahydrate [Fortaz/Tazicef] 2 gm Med  07/13/22 10:00 Active





 D5w 100 ml [D5w 100ML Mini Bag 100 ML] 100 ml   





 IV Q12HT   


 


 Docusate Sodium 100 mg*** [Docusate Sodium 100 MG***] Med  07/13/22 10:00 

Active





 100 mg PO DAILY   


 


 Famotidine 20 mg*** [Pepcid 20 MG***] Med  07/13/22 10:00 Active





 20 mg PO BID   


 


 Gabapentin *** [Neurontin ***] Med  07/13/22 10:00 Active





 300 - 600 mg PO TID   


 


 Lorazepam 1 mg*** [Ativan 1 MG***] Med  07/13/22 07:13 Active





 1 - 2 mg PO HS PRN PRN   


 


 Methadone HCl 10 mg*** [DOLOPHINE 10MG Tablet***] Med  07/13/22 07:14 Active





 20 mg PO TID PRN PRN   


 


 Patient Own Med [Patient Own Medication] Med  07/13/22 20:00 Active





 2,520 each IV Q24H PRN   


 


 Patient Own Med [Patient Own Medication] Med  07/13/22 11:00 Active





 See Dose Instructions  IV 0800   


 


 Patient Own Med [Patient Own Medication] Med  07/13/22 10:00 Discontinued





 See Dose Instructions  IV DAILY   


 


 Patient Own Med [Patient Own Medication] Med  07/13/22 20:00 Active





 See Dose Instructions  IV Q24H PRN   


 


 Vancomycin HCl Inj*** [Vancocin Injection***] 0.5 gm Med  07/13/22 22:00 Active





 NaCl 0.9% 100 ml Mini-Bag Plus [Sodium Chloride 100ML   





 MINI-BAG PLUS] 100 ml   





 IV Q12HT   


 


 Vancomycin HCl Inj*** [Vancocin Injection***] 500 gm Med  07/13/22 22:00 

Discontinued





 NaCl 0.9% 250 ml [Sodium Chloride 0.9% 250 ML] 250 ml   





 IV Q12HT   


 


 Vitamin B Comp W-C*** [Alyx-Bee with C] Med  07/13/22 10:00 Active





 1 tab PO DAILY   








                       Patient Care Notes (Last 24 hours)





07/14/22 07:47 CNA Note by Carol Ann Strong


pt weighed this am and weight was off so zeroed the bed and reweighed pt.  pt 

weight this am is 51.7 kg   this cna reported the weight to nurse Damian Jaffe.







Initialized on 07/14/22 07:47 - END OF NOTE








07/13/22 15:16 Nursing Note by Laura Mejia


spoke again with Emily at Dr. Gray's office and she stated that she let the 

doctor know the pt's labs and condition. States that the doctor would just like 

for him to take the "major bleeding" dose of his factor medication. passed info 

on to [facility] in pharmacy. 





Initialized on 07/13/22 15:16 - END OF NOTE








07/13/22 14:35 Nursing Note by Laura Mejia


Spoke with a nurse at the pharmacy at Dr. Gray's office. notified her of pt's 

swelling in elbow and hgb that dropped this morning. She is going to call one of

the doctors and call me back on what next steps need to be. 





Initialized on 07/13/22 14:35 - END OF NOTE








07/13/22 12:36 Case Management Note by Caty Donnelly


WILL HOLD DC PLANNING AT THIS TIME- PATIENT WAITING ON A BED AT [facility]- 

HOPEFULLY WILL HAVE A BED LATER TODAY





Initialized on 07/13/22 12:36 - END OF NOTE








07/13/22 12:30 Case Management Note by Caty Donnelly


PATIENT HAS [facility]. THEY WERE NOTIFIED PATIENT IS HERE AS OBS AND IS 

WAITING ON BED AT [facility]. THEY VERIFIED UNDERSTANDING.


IF PLANS WOULD CHANGE AND PATIENT WOULD DC HOME FROM HERE- [facility] NEEDS TO BE 

NOTIFIED -[phone]. THEY NEED FAXED THE DC INSTRUCTIONS, DC MED LIST AND 

DC SUMMARY ( IF AVAILABLE) -[phone]





Initialized on 07/13/22 12:30 - END OF NOTE








07/13/22 08:42 Nursing Note by Laura Mejia


Spoke with Violet at [facility], she states that there are still no beds at 

this time but hopefully will be this afternoon.


Code(s): M86.669 - OTHER CHRONIC OSTEOMYELITIS, UNSPECIFIED TIBIA AND FIBULA

## 2022-07-24 ENCOUNTER — HOSPITAL ENCOUNTER (EMERGENCY)
Dept: HOSPITAL 33 - ED | Age: 77
Discharge: HOME | End: 2022-07-24
Payer: MEDICARE

## 2022-07-24 VITALS — HEART RATE: 69 BPM | SYSTOLIC BLOOD PRESSURE: 189 MMHG | DIASTOLIC BLOOD PRESSURE: 88 MMHG

## 2022-07-24 VITALS — OXYGEN SATURATION: 96 %

## 2022-07-24 DIAGNOSIS — D66: Primary | ICD-10-CM

## 2022-07-24 DIAGNOSIS — Z79.899: ICD-10-CM

## 2022-07-24 DIAGNOSIS — M25.521: ICD-10-CM

## 2022-07-24 DIAGNOSIS — M25.021: ICD-10-CM

## 2022-07-24 PROCEDURE — 96374 THER/PROPH/DIAG INJ IV PUSH: CPT

## 2022-07-24 PROCEDURE — 96375 TX/PRO/DX INJ NEW DRUG ADDON: CPT

## 2022-07-24 PROCEDURE — 96376 TX/PRO/DX INJ SAME DRUG ADON: CPT

## 2022-07-24 PROCEDURE — 99284 EMERGENCY DEPT VISIT MOD MDM: CPT

## 2022-07-24 NOTE — ERPHSYRPT
- History of Present Illness


Time Seen by Provider: 07/24/22 21:15


Source: patient


Exam Limitations: no limitations


Patient Subjective Stated Complaint: pt having pain in his rt elbow. thinks he 

has a bleed d/t hemophilia


Triage Nursing Assessment: pt alert and oriented, answers questions approp. pt 

back per wheelchair, transfers to stretcher with assist of 1. respirations 

nonlabored. skin warm and dry. swelling and tenderness to rt arm. radial pulse 

wnl.


Physician History: 





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 

complaint of a painful right elbow from bleeding in the joint.  He is recently 

treated for cellulitis in the right lower extremity he does have 2500 of factor 

VIII with him.  We did talk to his hematologist Dr. Patrick in [place] she 

recommended we administer the 2500 units and control his pain.


Occurred: this afternoon


Method of Injury: unknown


Quality: aching, throbbing


Severity of Pain-Max: severe


Severity of Pain-Current: severe


Extremities Pain Location: elbow: right


Modifying Factors: Improves With: pain medication


Allergies/Adverse Reactions: 








No Known Drug Allergies Allergy (Verified 07/24/22 21:33)


   





Home Medications: 








Factor XIII [Corifact] 3,000 units IV BID 05/15/13 [History]


Lorazepam 1 mg*** [Ativan 1 MG***] 1 - 2 mg PO HS PRN 05/15/13 [History]


Methadone HCl 10 mg*** [DOLOPHINE 10MG Tablet***] 2 tab PO TID PRN PRN 05/15/13 

[History]


Celecoxib [Celebrex] 200 mg PO BID 08/20/13 [History]


Docusate Sodium 100 mg*** [Docusate Sodium 100 MG***] 100 mg PO DAILY 07/12/22 

[History]


Famotidine 20 mg PO BID 07/12/22 [History]


Folic Acid/Vit B Complex and C [B-Complex Plus Vitamin C Cplt] 1 each PO DAILY 

07/12/22 [History]


Gabapentin *** [Neurontin ***] 1 - 2 tab PO TID 07/12/22 [History]


Oxycodone HCl 15 mg PO Q4H PRN PRN 07/12/22 [History]


Pregabalin 50 mg*** [Lyrica 50MG***] 50 mg PO BID 07/12/22 [History]


ondansetron HCL [Zofran] 4 mg PO DAILY 07/12/22 [History]





Hx Tetanus, Diphtheria Vaccination/Date Given: No


Hx Influenza Vaccination/Date Given: Yes (10/12)


Hx Pneumococcal Vaccination/Date Given: Yes (unknown when)





Travel Risk





- International Travel


Have you traveled outside of the country in past 3 weeks: No





- Coronavirus Screening


Are you exhibiting any of the following symptoms?: No


Close contact with a COVID-19 positive Pt in past 14-21 Days: No





- Vaccine Status


Have you recieved a Covid-19 vaccination: Yes


: Unknown





- Vaccination Dates


Date of 2cond Vaccination (if applicable): na


Dates if Unknown: na





- Review of Systems


Constitutional: No Fever, No Chills


Eyes: No Symptoms


Ears, Nose, & Throat: No Symptoms


Respiratory: No Cough, No Dyspnea


Cardiac: No Chest Pain, No Edema, No Syncope


Abdominal/Gastrointestinal: No Abdominal Pain, No Nausea, No Vomiting, No 

Diarrhea


Genitourinary Symptoms: No Dysuria


Musculoskeletal: Joint Pain, Joint Swelling (Pain and swelling heat in the right

 elbow), No Back Pain, No Neck Pain


Skin: No Rash


Neurological: No Dizziness, No Focal Weakness, No Sensory Changes


Psychological: No Symptoms


Endocrine: No Symptoms


All Other Systems: Reviewed and Negative





- Past Medical History


Pertinent Past Medical History: Yes


Neurological History: No Pertinent History


ENT History: No Pertinent History


Cardiac History: No Pertinent History


Respiratory History: No Pertinent History


Endocrine Medical History: No Pertinent History


Musculoskeletal History: Osteoarthritis, Osteoporosis


GI Medical History: Cirrhosis


 History: No Pertinent History


Psycho-Social History: No Pertinent History


Male Reproductive Disorders: No Pertinent History


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





- Past Surgical History


Past Surgical History: Yes


Neuro Surgical History: No Pertinent History


Cardiac: No Pertinent History


Respiratory: No Pertinent History


Gastrointestinal: No Pertinent History


Genitourinary: No Pertinent History


Musculoskeletal: Joint Replacement, Orthopedic Surgery


Male Surgical History: No Pertinent History


Other Surgical History: left elbow spurs removed, Bilateral knee replacements, 

Left hip replacement.





- Social History


Smoking Status: Never smoker


Exposure to second hand smoke: No


Drug Use: none


Patient Lives Alone: Yes





- Nursing Vital Signs


Nursing Vital Signs: 


                               Initial Vital Signs











Temperature  97.3 F   07/24/22 21:10


 


Pulse Rate  69   07/24/22 21:10


 


Respiratory Rate  20   07/24/22 21:10


 


Blood Pressure  204/117   07/24/22 21:10


 


O2 Sat by Pulse Oximetry  96   07/24/22 21:10








                                   Pain Scale











Pain Intensity                 6

















- Physical Exam


General Appearance: moderate distress, alert


Eyes, Ears, Nose, Throat Exam: moist mucous membranes


Neck Exam: non-tender, supple


Cardiovascular/Respiratory Exam: chest non-tender, normal breath sounds, regular

 rate/rhythm, no respiratory distress


Abdominal Exam: non-tender, No guarding


Back Exam: normal inspection, No vertebral tenderness


Shoulder Exam: normal inspection, non-tender, normal ROM


Elbow/Forearm Exam: bone tenderness, limited ROM, soft tissue tenderness 

(Examination of the right elbow shows swelling heat erythema and tenderness.  An

 obvious joint effusion present probably secondary to bleeding)


Wrist Exam: normal inspection, non-tender, normal ROM


Hand Exam: normal inspection, non-tender, normal ROM


Neuro/Tendon Exam: normal sensation, normal motor functions


Mental Status Exam: alert, oriented x 3, cooperative


Skin Exam: normal color, warm, dry


**SpO2 Interpretation**: normal


SpO2: 96


O2 Delivery: Room Air





- Course


Nursing assessment & vital signs reviewed: Yes


Ordered Tests: 


Medication Summary














Discontinued Medications














Generic Name Dose Route Start Last Admin





  Trade Name Jarodq  PRN Reason Stop Dose Admin


 


Hydromorphone HCl  2 mg  07/24/22 21:19  07/24/22 21:29





  Hydromorphone 1 Mg/1ml Inj*** 1 Mg/Ml Syringe  IV  07/24/22 21:20  2 mg





  STAT ONE   Administration


 


Hydromorphone HCl  Confirm  07/24/22 21:20 





  Hydromorphone 1 Mg/1ml Inj*** 1 Mg/Ml Syringe  Administered  07/24/22 21:21 





  Dose  





  2 mg  





  .ROUTE  





  .STK-MED ONE  


 


Hydromorphone HCl  2 mg  07/24/22 21:38  07/24/22 21:57





  Hydromorphone 1 Mg/1ml Inj*** 1 Mg/Ml Syringe  IV  07/24/22 21:39  2 mg





  STAT ONE   Administration


 


Hydromorphone HCl  Confirm  07/24/22 21:54 





  Hydromorphone 1 Mg/1ml Inj*** 1 Mg/Ml Syringe  Administered  07/24/22 21:55 





  Dose  





  2 mg  





  .ROUTE  





  .STK-MED ONE  


 


Hydromorphone HCl  2 mg  07/24/22 22:21  07/24/22 22:27





  Hydromorphone 1 Mg/1ml Inj*** 1 Mg/Ml Syringe  IV  07/24/22 22:22  2 mg





  STAT ONE   Administration


 


Hydromorphone HCl  Confirm  07/24/22 22:23 





  Hydromorphone 1 Mg/1ml Inj*** 1 Mg/Ml Syringe  Administered  07/24/22 22:24 





  Dose  





  2 mg  





  .ROUTE  





  .STK-MED ONE  


 


Hydromorphone HCl  2 mg  07/24/22 22:34  07/24/22 22:51





  Hydromorphone 1 Mg/1ml Inj*** 1 Mg/Ml Syringe  IV  07/24/22 22:35  2 mg





  STAT ONE   Administration


 


Hydromorphone HCl  Confirm  07/24/22 22:48 





  Hydromorphone 1 Mg/1ml Inj*** 1 Mg/Ml Syringe  Administered  07/24/22 22:49 





  Dose  





  2 mg  





  .ROUTE  





  .STK-MED ONE  


 


Hydromorphone HCl  2 mg  07/24/22 23:17 





  Hydromorphone 1 Mg/1ml Inj*** 1 Mg/Ml Syringe  IV  07/24/22 23:18 





  STAT ONE  


 


Ondansetron HCl  Confirm  07/24/22 21:20 





  Ondansetron Hcl 4 Mg/2 Ml Vial  Administered  07/24/22 21:21 





  Dose  





  4 mg  





  .ROUTE  





  .STK-MED ONE  


 


Ondansetron HCl  4 mg  07/24/22 21:29  07/24/22 21:30





  Ondansetron Hcl 4 Mg/2 Ml Vial  IV  07/24/22 21:30  4 mg





  STAT ONE   Administration














- Progress


Progress: improved


Discussed with : Other (Dr Matta)





- Departure


Departure Disposition: Home


Clinical Impression: 


 Hemophilia A





Condition: Stable


Critical Care Time: No


Referrals: 


LETI CANTOR [Primary Care Provider] - Follow up/PCP as directed

## 2022-07-26 ENCOUNTER — HOSPITAL ENCOUNTER (EMERGENCY)
Dept: HOSPITAL 33 - ED | Age: 77
Discharge: HOME | End: 2022-07-26
Payer: MEDICARE

## 2022-07-26 ENCOUNTER — HOSPITAL ENCOUNTER (EMERGENCY)
Dept: HOSPITAL 33 - ED | Age: 77
Discharge: LEFT BEFORE DISCHARGE | End: 2022-07-26
Payer: MEDICARE

## 2022-07-26 VITALS — OXYGEN SATURATION: 96 %

## 2022-07-26 VITALS — HEART RATE: 81 BPM | SYSTOLIC BLOOD PRESSURE: 160 MMHG | DIASTOLIC BLOOD PRESSURE: 99 MMHG

## 2022-07-26 DIAGNOSIS — N39.0: Primary | ICD-10-CM

## 2022-07-26 DIAGNOSIS — Z79.899: ICD-10-CM

## 2022-07-26 DIAGNOSIS — L08.9: ICD-10-CM

## 2022-07-26 DIAGNOSIS — Z53.21: Primary | ICD-10-CM

## 2022-07-26 DIAGNOSIS — R41.0: ICD-10-CM

## 2022-07-26 DIAGNOSIS — D66: ICD-10-CM

## 2022-07-26 DIAGNOSIS — Z79.891: ICD-10-CM

## 2022-07-26 LAB
ALBUMIN SERPL-MCNC: 3.4 G/DL (ref 3.5–5)
ALP SERPL-CCNC: 115 U/L (ref 38–126)
ALT SERPL-CCNC: 16 U/L (ref 0–50)
ANION GAP SERPL CALC-SCNC: 7.8 MEQ/L (ref 5–15)
AST SERPL QL: 36 U/L (ref 17–59)
BASOPHILS # BLD AUTO: 0.04 X10^3/UL (ref 0–0.4)
BILIRUB BLD-MCNC: 0.6 MG/DL (ref 0.2–1.3)
BUN SERPL-MCNC: 15 MG/DL (ref 9–20)
CALCIUM SPEC-MCNC: 8.9 MG/DL (ref 8.4–10.2)
CHLORIDE SERPL-SCNC: 102 MMOL/L (ref 98–107)
CO2 SERPL-SCNC: 31 MMOL/L (ref 22–30)
CREAT SERPL-MCNC: 0.8 MG/DL (ref 0.66–1.25)
EOSINOPHIL # BLD AUTO: 0 X10^3/UL (ref 0–0.5)
GFR SERPLBLD BASED ON 1.73 SQ M-ARVRAT: > 60 ML/MIN
GLUCOSE SERPL-MCNC: 129 MG/DL (ref 74–106)
GLUCOSE UR-MCNC: NEGATIVE MG/DL
HCT VFR BLD AUTO: 32.2 % (ref 42–50)
HGB BLD-MCNC: 10.2 G/DL (ref 12.5–18)
LYMPHOCYTES # SPEC AUTO: 1.04 X10^3/UL (ref 1–4.6)
MCH RBC QN AUTO: 30.8 PG (ref 26–32)
MCHC RBC AUTO-ENTMCNC: 31.7 G/DL (ref 32–36)
MONOCYTES # BLD AUTO: 0.87 X10^3/UL (ref 0–1.3)
PLATELET # BLD AUTO: 237 X10^3/UL (ref 150–450)
POTASSIUM SERPLBLD-SCNC: 4.2 MMOL/L (ref 3.5–5.1)
PROT SERPL-MCNC: 7.5 G/DL (ref 6.3–8.2)
PROT UR STRIP-MCNC: 100 MG/DL
RBC # BLD AUTO: 3.31 X10^6/UL (ref 4.1–5.6)
RBC # UR AUTO: (no result) ERY/UL (ref 0–5)
RBC #/AREA URNS HPF: >101 /HPF (ref 0–2)
SODIUM SERPL-SCNC: 136 MMOL/L (ref 137–145)
UA DIPSTICK PNL UR: (no result)
URINE CULTURED INDICATED?: YES
WBC # BLD AUTO: 12.5 X10^3/UL (ref 4–10.5)

## 2022-07-26 PROCEDURE — 36415 COLL VENOUS BLD VENIPUNCTURE: CPT

## 2022-07-26 PROCEDURE — 96365 THER/PROPH/DIAG IV INF INIT: CPT

## 2022-07-26 PROCEDURE — 81015 MICROSCOPIC EXAM OF URINE: CPT

## 2022-07-26 PROCEDURE — 80053 COMPREHEN METABOLIC PANEL: CPT

## 2022-07-26 PROCEDURE — 99283 EMERGENCY DEPT VISIT LOW MDM: CPT

## 2022-07-26 PROCEDURE — 70450 CT HEAD/BRAIN W/O DYE: CPT

## 2022-07-26 PROCEDURE — 87086 URINE CULTURE/COLONY COUNT: CPT

## 2022-07-26 PROCEDURE — 85025 COMPLETE CBC W/AUTO DIFF WBC: CPT

## 2022-07-26 PROCEDURE — 72131 CT LUMBAR SPINE W/O DYE: CPT

## 2022-07-26 NOTE — ERPHSYRPT
- History of Present Illness


Time Seen by Provider: 07/26/22 02:28


Source: patient


Exam Limitations: no limitations


Patient Subjective Stated Complaint: pt states he is having severe lower back 

pain an r elbow pain today worse than yesterday, pt was here yesterday.


Triage Nursing Assessment: pt alert and oriented, hard of hearing, some info 

given by son. pt is grimacing and gaurding r arm


Physician History: 





This is a 76-year-old white male who has a history of hemophilia A and was seen 

in our emergency department on 7/12/2022 for symptoms of chronic osteomyelitis 

and then again on 7/24/2022 with a spontaneous bleed into his right elbow.  On 

the visit dated 7/24/2022, the patient did receive factor VIII intravenously.  

Patient was seen by his hematologist in [place] on 7/25/2022 with no 

specific instructions or labs obtained.  Patient patient was dropped off by the 

POA at 8:30 PM.  However, the patient contacted the POA at 1:30 AM with 

complaints of lower back pain.  He also urinated on himself and was a bit 

confused.  Patient denies fall.  He denies hitting his head.  He denies falling 

and injuring his back.  Patient has a history of osteoarthritis and 

osteoporosis.  Patient also has a history of intermittent chronic confusion.  

Patient is also taking oxycodone, Ativan and methadone.  During his visit on 

7/24/2022 emergency department provided the patient with several milligrams of 

Dilaudid intravenously because of his right elbow pain.  Patient POA states that

the patient is on doxycycline and Bactrim DS for chronic wound infection.


Timing/Duration: today


Method of Injury: other (None)


Quality: aching


Back Pain Location: lumbar spine, paraspinous muscles


Severity of Pain-Max: moderate


Severity of Pain-Current: moderate


Modifying Factors: Improves With: movement


Associated Symptoms: urinary incontinence (Mild episode), lower back pain, No 

fever, No chills, No loss of bowel control


Previous symptoms: same symptoms as today, recently seen, recent hospitalizat

ion, recently treated


Allergies/Adverse Reactions: 








No Known Drug Allergies Allergy (Verified 07/26/22 02:23)


   





Home Medications: 








Factor XIII [Corifact] 3,000 units IV BID 05/15/13 [History]


Lorazepam 1 mg*** [Ativan 1 MG***] 1 - 2 mg PO HS PRN 05/15/13 [History]


Methadone HCl 10 mg*** [DOLOPHINE 10MG Tablet***] 2 tab PO TID PRN PRN 05/15/13 

[History]


Celecoxib [Celebrex] 200 mg PO BID 08/20/13 [History]


Docusate Sodium 100 mg*** [Docusate Sodium 100 MG***] 100 mg PO DAILY 07/12/22 

[History]


Famotidine 20 mg PO BID 07/12/22 [History]


Folic Acid/Vit B Complex and C [B-Complex Plus Vitamin C Cplt] 1 each PO DAILY 

07/12/22 [History]


Gabapentin *** [Neurontin ***] 1 - 2 tab PO TID 07/12/22 [History]


Oxycodone HCl 15 mg PO Q4H PRN PRN 07/12/22 [History]


Pregabalin 50 mg*** [Lyrica 50MG***] 50 mg PO BID 07/12/22 [History]


ondansetron HCL [Zofran] 4 mg PO DAILY 07/12/22 [History]





Hx Tetanus, Diphtheria Vaccination/Date Given: No


Hx Influenza Vaccination/Date Given: Yes (10/12)


Hx Pneumococcal Vaccination/Date Given: Yes (unknown when)





Travel Risk





- International Travel


Have you traveled outside of the country in past 3 weeks: No





- Coronavirus Screening


Are you exhibiting any of the following symptoms?: No


Close contact with a COVID-19 positive Pt in past 14-21 Days: No





- Vaccine Status


Have you recieved a Covid-19 vaccination: Yes


: Unknown





- Vaccination Dates


Dates if Unknown: unknown





- Review of Systems


Constitutional: No Symptoms


Eyes: No Symptoms


Ears, Nose, & Throat: No Symptoms


Respiratory: No Symptoms


Cardiac: No Symptoms


Abdominal/Gastrointestinal: No Symptoms


Genitourinary Symptoms: Incontinence (Single episode early morning)


Musculoskeletal: Back Pain, No Fall


Skin: No Symptoms


Neurological: No Symptoms


Psychological: No Symptoms


Endocrine: No Symptoms


Hematologic/Lymphatic: No Symptoms


Immunological/Allergic: No Symptoms





- Past Medical History


Pertinent Past Medical History: Yes


Neurological History: No Pertinent History


ENT History: No Pertinent History


Cardiac History: No Pertinent History


Respiratory History: No Pertinent History


Endocrine Medical History: No Pertinent History


Musculoskeletal History: Osteoarthritis, Osteoporosis


GI Medical History: Cirrhosis


 History: No Pertinent History


Psycho-Social History: No Pertinent History


Male Reproductive Disorders: No Pertinent History


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





- Past Surgical History


Past Surgical History: Yes


Neuro Surgical History: No Pertinent History


Cardiac: No Pertinent History


Respiratory: No Pertinent History


Gastrointestinal: No Pertinent History


Genitourinary: No Pertinent History


Musculoskeletal: Joint Replacement, Orthopedic Surgery


Male Surgical History: No Pertinent History


Other Surgical History: left elbow spurs removed, Bilateral knee replacements, 

Left hip replacement.





- Social History


Smoking Status: Never smoker


Exposure to second hand smoke: No


Drug Use: none


Patient Lives Alone: Yes





- Nursing Vital Signs


Nursing Vital Signs: 


                               Initial Vital Signs











Temperature  97.7 F   07/26/22 02:15


 


Pulse Rate  97 H  07/26/22 02:15


 


Respiratory Rate  18   07/26/22 02:15


 


Blood Pressure  189/98   07/26/22 02:15


 


O2 Sat by Pulse Oximetry  96   07/26/22 02:15








                                   Pain Scale











Pain Intensity                 6

















- Physical Exam


General Appearance: no apparent distress, alert, anxiety


Eye Exam: PERRL/EOMI, eyes nml inspection


Ears, Nose, Throat Exam: normal ENT inspection, moist mucous membranes


Neck Exam: normal inspection, non-tender, supple, full range of motion


Respiratory Exam: normal breath sounds, lungs clear, airway intact, No chest 

tenderness, No respiratory distress


Cardiovascular Exam: regular rate/rhythm, normal heart sounds, normal peripheral

 pulses


Gastrointestinal Exam: soft, normal bowel sounds, No tenderness


Rectal Exam: not done


Extremity Exam: pelvis stable, swelling (Right elbow), tenderness (Right elbow),

 other (Low back pain)


Neurologic Exam: alert, oriented x 3, cooperative, CNs II-XII nml as tested, 

normal mood/affect, sensation nml


Skin Exam: warm, dry, other (Patient with right lower extremity wound bandage in

 place.)


Lymphatic Exam: No adenopathy


**SpO2 Interpretation**: normal


SpO2: 96


O2 Delivery: Room Air





- Course


Nursing assessment & vital signs reviewed: Yes


Ordered Tests: 


                               Active Orders 24 hr











 Category Date Time Status


 


 HEAD WITHOUT CONTRAST [CT] Stat Exams  07/26/22 02:38 Taken


 


 LUMBAR SPINE W/O [CT] Stat Exams  07/26/22 02:44 Taken


 


 CBC W DIFF Stat Lab  07/26/22 03:15 Completed


 


 CMP Stat Lab  07/26/22 03:20 Completed


 


 CULTURE,URINE Stat Lab  07/26/22 03:02 Received


 


 UA W/RFX CULTURE Stat Lab  07/26/22 03:02 Completed








Medication Summary














Discontinued Medications














Generic Name Dose Route Start Last Admin





  Trade Name Griselda  PRN Reason Stop Dose Admin


 


Levofloxacin/Dextrose  750 mg in 150 mls @ 100 mls/hr  07/26/22 03:40  07/26/22 

03:52





  Levofloxacin 750mg/150ml D5w  IV  07/26/22 05:09  100 ml/hr





  STAT STA   100 mls/hr





    Administration


 


Levofloxacin/Dextrose  Confirm  07/26/22 03:51 





  Levofloxacin 750mg/150ml D5w  Administered  07/26/22 03:52 





  Dose  





  750 mg in 150 mls @ ud  





  IV  





  .Enablon-MED ONE  











Lab/Rad Data: 


                           Laboratory Result Diagrams





                                 07/26/22 03:15 





                                 07/26/22 03:20 





                               Laboratory Results











  07/26/22 07/26/22 07/26/22 Range/Units





  03:20 03:15 03:02 


 


WBC   12.5 H   (4.0-10.5)  x10^3/uL


 


RBC   3.31 L   (4.1-5.6)  x10^6/uL


 


Hgb   10.2 L   (12.5-18.0)  g/dL


 


Hct   32.2 L   (42-50)  %


 


MCV   97.3   ()  fL


 


MCH   30.8   (26-32)  pg


 


MCHC   31.7 L   (32-36)  g/dL


 


RDW   13.6   (11.5-14.0)  %


 


Plt Count   237   (150-450)  x10^3/uL


 


MPV   9.0   (7.5-11.0)  fL


 


Gran %   84.0 H   (36.0-66.0)  %


 


Immature Gran % (Auto)   0.5 H   (0.00-0.4)  %


 


Nucleat RBC Rel Count   0.0   (0.00-0.1)  %


 


Eos # (Auto)   0   (0-0.5)  x10^3/uL


 


Immature Gran # (Auto)   0.06 H   (0.00-0.03)  x10^3u/L


 


Absolute Lymphs (auto)   1.04   (1.0-4.6)  x10^3/uL


 


Absolute Monos (auto)   0.87   (0.0-1.3)  x10^3/uL


 


Absolute Nucleated RBC   0.00   (0.00-0.01)  x10^3u/L


 


Lymphocytes %   8.3 L   (24.0-44.0)  %


 


Monocytes %   6.9   (0.0-12.0)  %


 


Eosinophils %   0.0   (0.00-5.0)  %


 


Basophils %   0.3   (0.0-0.4)  %


 


Absolute Granulocytes   10.51 H   (1.4-6.9)  x10^3/uL


 


Basophils #   0.04   (0-0.4)  x10^3/uL


 


Sodium  136 L    (137-145)  mmol/L


 


Potassium  4.2    (3.5-5.1)  mmol/L


 


Chloride  102    ()  mmol/L


 


Carbon Dioxide  31 H    (22-30)  mmol/L


 


Anion Gap  7.8    (5-15)  MEQ/L


 


BUN  15    (9-20)  mg/dL


 


Creatinine  0.80    (0.66-1.25)  mg/dL


 


Estimated GFR  > 60.0    ML/MIN


 


Glucose  129 H    ()  mg/dL


 


Calcium  8.9    (8.4-10.2)  mg/dL


 


Total Bilirubin  0.60    (0.2-1.3)  mg/dL


 


AST  36    (17-59)  U/L


 


ALT  16    (0-50)  U/L


 


Alkaline Phosphatase  115    ()  U/L


 


Serum Total Protein  7.5    (6.3-8.2)  g/dL


 


Albumin  3.4 L    (3.5-5.0)  g/dL


 


Urinalys Dipstick Clnc    MAIN LAB  


 


Urine Color    YELLOW  (YELLOW)  


 


Urine Appearance    SLIGHTLY CLOUDY  (CLEAR)  


 


Urine pH    6.0  (5-6)  


 


Ur Specific Gravity    1.025  (1.005-1.025)  


 


POC Urine Protein Conf    100  (Negative)  


 


Urine Ketones    TRACE  (NEGATIVE)  


 


Urine Nitrite    NEGATIVE  (NEGATIVE)  


 


Urine Bilirubin    NEGATIVE  (NEGATIVE)  


 


Urine Urobilinogen    0.2  (0-1)  mg/dL


 


Urine Leukocytes    TRACE  (NEGATIVE)  


 


Urine WBC (Auto)    16-25  (0-5)  /HPF


 


Urine RBC (Auto)    >101  (0-2)  /HPF


 


U Epithel Cells (Auto)    RARE  (FEW)  /HPF


 


Urine Bacteria (Auto)    RARE  (NEGATIVE)  /HPF


 


Urine RBC    LARGE  (0-5)  Kevin/ul


 


Urine Mucus (Auto)    SLIGHT  (NEGATIVE)  /HPF


 


Ur Culture Indicated?    YES  


 


Urine Glucose    NEGATIVE  (NEGATIVE)  mg/dL














- Progress


Progress: improved, pain not gone completely, re-examined


Progress Note: 





07/26/22 06:05


CAT scan of the head shows no acute intracranial abnormality.


CAT scan of the lumbar spine shows L2 with mild spinal stenosis present.





Medical decision making: This patient was reexamined after IV fluids, IV 

antibiotics and his confusion has resolved.  He states his pain is also 

improved.  This occurred without infusing the patient with any narcotic pain 

medication.  He did receive intravenous Levaquin for a urinary tract infection. 

 Patient is also on doxycycline and Bactrim DS.  We assume this is for treatment

 of his leg infection ?MRSA/staph.  Dr. Anderson is the wound specialist caring 

for this patient's wound and we will defer antibiotic use to him knowing that he

 has chronic osteomyelitis as well as a urinary tract infection.


Counseled pt/family regarding: lab results, diagnosis, need for follow-up, rad 

results





- Departure


Departure Disposition: Home


Clinical Impression: 


 Complicated wound infection, Urinary tract infection





Condition: Stable


Critical Care Time: No


Referrals: 


LETI CANTOR [Primary Care Provider] - Follow up/PCP as directed


Additional Instructions: 


Hold on taking doxycycline and the Bactrim DS until after you spoke with your 

wound care specialist.  Take all your other medication as prescribed.  Contact 

all your specialist today to update them on your recent emergency department 

evaluations management and results.


Prescriptions: 


Levofloxacin*** [Levaquin 500 MG Tablet***] 500 mg PO DAILY #7 tablet

## 2022-07-27 NOTE — XRAY
Exam: CT of the lumbar spine without IV contrast from 07/26/2022.



CTDI: 22.70 mGy



Comparison: CT of the abdomen with IV contrast including reconstructed coronal

and sagittal images from 09/12/2016.



Indication: 76-year-old male with confusion, low back pain.



Technique: Non-IV contrast axial images were obtained from the lower thoracic

spine down to the upper aspect of the sacrum.  Reconstructed coronal and

sagittal images were created and reviewed.  The images were filmed with a

sharp bone window filter.



Findings: There appears to be a new moderate compression fracture deformity of

the superior vertebral endplate of L2 as compared to the prior study from

09/12/2016.  Mild cortical displacement is seen just to the right of midline

on the coronal images and at the anterior and mid aspects of the superior L2

vertebral endplate.  In addition, there is some cortical buckling at the upper

posterior margin of the L2 vertebral body which protrudes posteriorly into the

spinal canal causing moderate to marked spinal canal stenosis.  AP dimension

of the spinal sac at the upper L2 level measures 6.5 mm on midline sagittal

image #[id number].



In addition, there is a new mild generalized compression fracture deformity of

the L4 vertebral body, again primarily affecting the superior endplate.

Although new from 09/12/2016, the exact age of this second increased fracture

deformity is not clear.  This could be acute or subacute, although I see no

significant cortex interruption or distinct fracture line.



I again note a severe biconcave compression fracture deformity of T12 and

stable mild anterior wedge compression fracture deformities of L3 and L5.

Only the L1 vertebral body height remains fairly well-preserved.



I see no AP subluxation or spondylolysis.  The bones are demineralized.  Mild

anterior and posterior vertebral endplate spurring is seen throughout the

lumbar spine with some vacuum disc phenomena within the anterior aspect of

T12-L1 and the anterior and posterior aspects of L2-L3.  There appears to be

mild posterior facet joint arthropathy bilaterally at L2-L3 and L3-L4 and

moderate posterior facet joint arthropathy bilaterally at L4-L5 and L5-S1.  I

also note considerable thickening of the ligamentum flavum throughout the

lumbar spine.  Multilevel mild to moderate bilateral neural foraminal

narrowing is seen due to multilevel disc bulging, posterior vertebral endplate

spurring, and facet joint arthropathy.



There is mild to moderate diffuse bulging of the T12-L1 disc, although no

significant spinal stenosis is seen at this level.  I also note mild bulging

of the L1-L2 disc with moderate to marked spinal canal spinal stenosis at this

level due to the superior vertebral endplate L2 fracture which protrudes

posteriorly into the spinal canal.  See above.  Diffuse bulging of the disc,

hypertrophy of the ligamentum flavum, and posterior vertebral endplate

spurring and facet joint arthropathy appear to cause a marked central canal

spinal stenosis at L2-L3.



At L3-L4, diffuse bulging of the disc, posterior vertebral endplate spurring,

hypertrophy of the ligament flavum, and facet joint arthropathy cause a marked

lumbar canal spinal stenosis.  Diffuse bulging of the L4-L5 disc coupled with

osteoarthritic spurring and ligamentum flavum hypertrophy causes a moderate to

marked lumbar canal spinal stenosis at L4-L5.  At L5-S1, there is mild diffuse

bulging of the disc and thickening of the ligamentum flavum without any

significant cysts lumbar canal spinal stenosis.  A definite significant acute

lumbar herniated nucleus pulposus cannot be detected by this exam.



Numerous calcified granulomas are seen within the spleen.  There is been

progression of a staghorn calculus within the left upper collecting system and

renal pelvis which is considerably larger than on 12 2016.  I also note more

nonobstructing calculi within the right kidney.  Correlate clinically.  A

calcified, mildly tortuous abdominal aorta is seen.  No abdominal aortic

aneurysm is evident.



Impression:



1.  There appears to be an acute moderate compression fracture deformity

affecting primarily the superior vertebral endplate of L2.  Posterior cortical

buckling is seen at the upper posterior margin of L2 which protrudes into the

spinal canal resulting in a marked lumbar canal spinal stenosis, the AP

diameter in the midline being about 6.5 mm.



2.  In addition, there is a new mild to moderate compression deformity of L4

as compared to 09/12/2016.  The exact age of this worsening is not clear.  An

acute or subacute fracture is not excluded at this level.



3.  Compression fracture deformities at T12, L3, and L5 appear similar to

09/12/2016.



4.  Bone demineralization, multilevel lumbar canal spinal stenosis (most

pronounced from L1-L2 through L4-L5), and moderate multilevel bilateral neural

foraminal narrowing secondary to degenerative changes are also seen.



5.  Enlarging left renal staghorn calculus and increased number and size of

multiple renal calculi in the right kidney.  Correlate clinically.

## 2022-07-27 NOTE — XRAY
Exam: CT of the head without IV contrast from 07/26/2022.



CTDI: 53.92 mGy



Comparison: [None.]



Indication: 76-year-old male with confusion and/or disorientation, altered

mental status/memory loss.



Technique: Non-IV contrast axial images were obtained through the brain.

Reconstructed coronal and sagittal images were created and reviewed.



Findings: The ventricles appear within normal limits of size for the patient's

age.  Mild cortical atrophic changes are seen.  No focal mass effect or

midline shift is seen.  I see no acute intracranial bleed or abnormal

extra-axial fluid collection.  Moderate bilateral periventricular and

subcortical white matter changes are seen, likely reflecting chronic small

vessel ischemic disease.  A discrete low attenuation infarct within a major

cerebral or cerebellar artery distribution is not seen.  Structures of the

posterior fossa appear unremarkable.



The calvarium of the skull appears intact.  The orbits reveal no significant

abnormality.  The visualized paranasal sinuses are clear without air-fluid

levels.  There is deviation of the nasal septum toward the left.  Some dental

amalgam causing artifactual streaking is seen within the mouth.  The mastoid

air cells are clear without effusion.  There appears to be significant cerumen

within the right external artery canal.  Correlate clinically.  The middle ear

cavities appear grossly unremarkable.



Impression:



1.  I see no acute intracranial bleed or other acute intracranial process.



2.  Aging brain with atrophic changes.



3.  Moderate bilateral periventricular and subcortical white matter changes,

likely reflecting chronic small vessel ischemic disease.  A discrete low

attenuation infarct is not seen.

## 2022-10-21 ENCOUNTER — HOSPITAL ENCOUNTER (EMERGENCY)
Dept: HOSPITAL 33 - ED | Age: 77
Discharge: ANOTHER HOSPITAL | End: 2022-10-21
Payer: MEDICARE

## 2022-10-21 VITALS — OXYGEN SATURATION: 98 % | HEART RATE: 86 BPM

## 2022-10-21 VITALS — DIASTOLIC BLOOD PRESSURE: 73 MMHG | SYSTOLIC BLOOD PRESSURE: 136 MMHG

## 2022-10-21 DIAGNOSIS — R11.0: ICD-10-CM

## 2022-10-21 DIAGNOSIS — Z87.442: ICD-10-CM

## 2022-10-21 DIAGNOSIS — D66: ICD-10-CM

## 2022-10-21 DIAGNOSIS — R10.9: ICD-10-CM

## 2022-10-21 DIAGNOSIS — R30.0: ICD-10-CM

## 2022-10-21 DIAGNOSIS — Z79.899: ICD-10-CM

## 2022-10-21 DIAGNOSIS — N20.0: ICD-10-CM

## 2022-10-21 DIAGNOSIS — N39.0: Primary | ICD-10-CM

## 2022-10-21 DIAGNOSIS — Z87.440: ICD-10-CM

## 2022-10-21 LAB
ALBUMIN SERPL-MCNC: 3.9 G/DL (ref 3.5–5)
ALP SERPL-CCNC: 152 U/L (ref 38–126)
ALT SERPL-CCNC: 18 U/L (ref 0–50)
ANION GAP SERPL CALC-SCNC: 8.9 MEQ/L (ref 5–15)
AST SERPL QL: 37 U/L (ref 17–59)
BASOPHILS # BLD AUTO: 0.02 X10^3/UL (ref 0–0.4)
BILIRUB BLD-MCNC: 0.4 MG/DL (ref 0.2–1.3)
BUN SERPL-MCNC: 14 MG/DL (ref 9–20)
CALCIUM SPEC-MCNC: 8.7 MG/DL (ref 8.4–10.2)
CHLORIDE SERPL-SCNC: 104 MMOL/L (ref 98–107)
CO2 SERPL-SCNC: 30 MMOL/L (ref 22–30)
CREAT SERPL-MCNC: 0.65 MG/DL (ref 0.66–1.25)
EOSINOPHIL # BLD AUTO: 0.02 X10^3/UL (ref 0–0.5)
GFR SERPLBLD BASED ON 1.73 SQ M-ARVRAT: > 60 ML/MIN
GLUCOSE SERPL-MCNC: 105 MG/DL (ref 74–106)
GLUCOSE UR-MCNC: NEGATIVE MG/DL
HCT VFR BLD AUTO: 32.8 % (ref 42–50)
HGB BLD-MCNC: 10 G/DL (ref 12.5–18)
LIPASE SERPL-CCNC: 25 U/L (ref 23–300)
LYMPHOCYTES # SPEC AUTO: 0.84 X10^3/UL (ref 1–4.6)
MCH RBC QN AUTO: 27.3 PG (ref 26–32)
MCHC RBC AUTO-ENTMCNC: 30.5 G/DL (ref 32–36)
MONOCYTES # BLD AUTO: 0.49 X10^3/UL (ref 0–1.3)
PLATELET # BLD AUTO: 201 X10^3/UL (ref 150–450)
POTASSIUM SERPLBLD-SCNC: 4.5 MMOL/L (ref 3.5–5.1)
PROT SERPL-MCNC: 8.1 G/DL (ref 6.3–8.2)
PROT UR STRIP-MCNC: 100 MG/DL
RBC # BLD AUTO: 3.66 X10^6/UL (ref 4.1–5.6)
RBC # UR AUTO: (no result) ERY/UL (ref 0–5)
RBC #/AREA URNS HPF: (no result) /HPF (ref 0–2)
SODIUM SERPL-SCNC: 139 MMOL/L (ref 137–145)
UA DIPSTICK PNL UR: (no result)
URINE CULTURED INDICATED?: YES
WBC # BLD AUTO: 8.1 X10^3/UL (ref 4–10.5)
WBC #/AREA URNS HPF: (no result) /HPF (ref 0–5)

## 2022-10-21 PROCEDURE — 96376 TX/PRO/DX INJ SAME DRUG ADON: CPT

## 2022-10-21 PROCEDURE — 96365 THER/PROPH/DIAG IV INF INIT: CPT

## 2022-10-21 PROCEDURE — 96374 THER/PROPH/DIAG INJ IV PUSH: CPT

## 2022-10-21 PROCEDURE — 80053 COMPREHEN METABOLIC PANEL: CPT

## 2022-10-21 PROCEDURE — 85025 COMPLETE CBC W/AUTO DIFF WBC: CPT

## 2022-10-21 PROCEDURE — 87040 BLOOD CULTURE FOR BACTERIA: CPT

## 2022-10-21 PROCEDURE — 83605 ASSAY OF LACTIC ACID: CPT

## 2022-10-21 PROCEDURE — 74176 CT ABD & PELVIS W/O CONTRAST: CPT

## 2022-10-21 PROCEDURE — 87086 URINE CULTURE/COLONY COUNT: CPT

## 2022-10-21 PROCEDURE — 83690 ASSAY OF LIPASE: CPT

## 2022-10-21 PROCEDURE — 99285 EMERGENCY DEPT VISIT HI MDM: CPT

## 2022-10-21 PROCEDURE — 36415 COLL VENOUS BLD VENIPUNCTURE: CPT

## 2022-10-21 PROCEDURE — 96375 TX/PRO/DX INJ NEW DRUG ADDON: CPT

## 2022-10-21 PROCEDURE — 81015 MICROSCOPIC EXAM OF URINE: CPT

## 2022-10-21 NOTE — XRAY
Indication: Left flank pain.



Multiple contiguous axial images obtained through the abdomen and pelvis

without contrast using renal stone protocol.



Comparison: CT abdomen September 12, 2016



Lung bases again demonstrates small lingula calcified granuloma.  Minimal

bibasilar subsegmental atelectasis/scarring.  No infiltrate or effusion.

Heart not enlarged.



Left kidney demonstrates interval enlarging large staghorn calculus now at

least 5.2 cm in CC dimension, previously 3.9 cm.  Right kidney again

demonstrates multiple calculi increased in number and size, largest 1 cm.  No

hydronephrosis or evidence for obstructive uropathy.



Right hip prosthesis produces extensive beam artifact limiting this level.

Noncontrasted stomach and bowel loops nonobstructed with now moderate fecal

debris in the right hemicolon.  No gross free fluid/air.  Again 6 mm gallstone

and numerous tiny hepatic/splenic calcified granulomas.



Remaining liver, gallbladder, pancreas, spleen, adrenal glands, kidneys,

ureters, and bladder are unremarkable for noncontrast exam.  Minimal scattered

aortoiliac calcifications without AAA.



Osseous structures again demonstrates osteopenia, mild dextroscoliosis,

worsening multilevel thoracolumbar compression fractures, and mild left hip

degenerative arthropathy.  The left groin demonstrates 2.2 x 3.1 x 6.7 cm soft

tissue mass, probable lymphadenopathy.



Impression:

1.  Beam artifact from right hip prosthesis.

2.  Enlarging left renal staghorn calculus and worsening right renal calculi

as detailed.  Negative for hydronephrosis or evidence for obstructive uropathy.

3.  Left groin soft tissue mass, probable lymphadenopathy.

4.  Again osteopenia and multilevel thoracolumbar compression fractures.

Compared to CT lumbar spine July 26, 2022, there is worsening 50-75% L2 height

loss.

5.  Again incidental cholelithiasis, arteriosclerotic disease, and evidence

for old granulomatous disease.

## 2022-10-21 NOTE — ERPHSYRPT
- History of Present Illness


Time Seen by Provider: 10/21/22 11:31


Historian: patient, family


Exam Limitations: no limitations


Patient Subjective Stated Complaint: pt to ER with complaints of left flank pain

since yesterday. pt denies N/V. pt family member states pt was in hospital in 

[facility] 2-3 months ago for kidney stone/kidney infection and then was in nursing 

home for 4 weeks and just released 4 weeks ago.


Triage Nursing Assessment: pt A&Ox3. pt wheelchair bound. pt states he has had 

left flank pain since yesterday. skin pwd. pt with valderrama port.


Physician History: 





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 

extremity currently doing wound care follow-up presented in the ER with chief 

complaint of left flank pain since yesterday with progressive worsening, sharp 

moderate intensity pain with aggravation on movement, palpation, associated 

nausea but no vomiting or diarrhea.  No fever or chills reported.  Also report 

having some difficulty urination with dysuria and increased frequency but no 

urgency or hesitancy.


Timing/Duration: yesterday, gradual onset, worse


Activities at Onset: rest


Quality: sharpness


Abdominal Pain Onset Location: flank


Pain Radiation: no radiation


Severity of Pain-Max: moderate


Severity of Pain-Current: moderate


Modifying Factors: Worsens With: movement, palpation


Associated Symptoms: nausea


Previous symptoms: same symptoms as today


Allergies/Adverse Reactions: 








No Known Drug Allergies Allergy (Verified 10/21/22 11:52)


   





Home Medications: 








Factor XIII [Corifact] 3,000 units IV BID 05/15/13 [History]


Lorazepam 1 mg*** [Ativan 1 MG***] 1 - 2 mg PO HS PRN 05/15/13 [History]


Methadone HCl 10 mg*** [DOLOPHINE 10MG Tablet***] 2 tab PO TID PRN PRN 05/15/13 

[History]


Celecoxib [Celebrex] 200 mg PO BID 08/20/13 [History]


Docusate Sodium 100 mg*** [Docusate Sodium 100 MG***] 100 mg PO DAILY 07/12/22 

[History]


Famotidine 20 mg PO BID 07/12/22 [History]


Folic Acid/Vit B Complex and C [B-Complex Plus Vitamin C Cplt] 1 each PO DAILY 

07/12/22 [History]


Gabapentin *** [Neurontin ***] 1 - 2 tab PO TID 07/12/22 [History]


Oxycodone HCl 15 mg PO Q4H PRN PRN 07/12/22 [History]


Pregabalin 50 mg*** [Lyrica 50MG***] 50 mg PO BID 07/12/22 [History]


ondansetron HCL [Zofran] 4 mg PO DAILY 07/12/22 [History]


Amlodipine Besylate 10 mg PO DAILY 10/21/22 [History]





Hx Tetanus, Diphtheria Vaccination/Date Given: No


Hx Influenza Vaccination/Date Given: Yes (10/12)


Hx Pneumococcal Vaccination/Date Given: Yes (unknown when)





Travel Risk





- International Travel


Have you traveled outside of the country in past 3 weeks: No





- Coronavirus Screening


Are you exhibiting any of the following symptoms?: No


Close contact with a COVID-19 positive Pt in past 14-21 Days: No





- Vaccine Status


Have you recieved a Covid-19 vaccination: Yes


: Unknown





- Vaccination Dates


Date of 2cond Vaccination (if applicable): unk


Dates if Unknown: unknown





- Review of Systems


Constitutional: Fatigue, Weakness


Eyes: No Symptoms


Ears, Nose, & Throat: No Symptoms


Respiratory: No Symptoms


Cardiac: No Symptoms


Abdominal/Gastrointestinal: Abdominal Pain, Nausea


Genitourinary Symptoms: Dysuria, Frequency


Musculoskeletal: Arthralgias


Skin: No Symptoms


Neurological: No Symptoms


Endocrine: No Symptoms


Hematologic/Lymphatic: Easy Bleeding, Easy Bruising





- Past Medical History


Pertinent Past Medical History: Yes


Neurological History: No Pertinent History


ENT History: No Pertinent History


Cardiac History: No Pertinent History


Respiratory History: No Pertinent History


Endocrine Medical History: No Pertinent History


Musculoskeletal History: Osteoarthritis, Osteoporosis


GI Medical History: Cirrhosis


 History: No Pertinent History


Psycho-Social History: No Pertinent History


Male Reproductive Disorders: No Pertinent History


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





- Past Surgical History


Past Surgical History: Yes


Neuro Surgical History: No Pertinent History


Cardiac: No Pertinent History


Respiratory: No Pertinent History


Gastrointestinal: No Pertinent History


Genitourinary: No Pertinent History


Musculoskeletal: Joint Replacement, Orthopedic Surgery


Male Surgical History: No Pertinent History


Other Surgical History: left elbow spurs removed, Bilateral knee replacements, 

Left hip replacement. valderrama port placed





- Social History


Smoking Status: Never smoker


Exposure to second hand smoke: No


Drug Use: none


Patient Lives Alone: Yes





- Nursing Vital Signs


Nursing Vital Signs: 


                               Initial Vital Signs











Temperature  98.5 F   10/21/22 11:43


 


Pulse Rate  88   10/21/22 11:43


 


Respiratory Rate  17   10/21/22 11:43


 


O2 Sat by Pulse Oximetry  99   10/21/22 11:43








                                   Pain Scale











Pain Intensity                 5

















- Physical Exam


General Appearance: no apparent distress, alert


Eye Exam: PERRL/EOMI


Ears, Nose, Throat Exam: pharynx normal


Neck Exam: normal inspection, full range of motion


Respiratory Exam: normal breath sounds, lungs clear


Cardiovascular Exam: regular rate/rhythm, normal heart sounds


Gastrointestinal/Abdomen Exam: soft, normal bowel sounds, tenderness (Right 

flank/right lower quadrant)


Extremity Exam: pelvis stable


Neurologic Exam: alert, oriented x 3, cooperative


Skin Exam: normal color


**SpO2 Interpretation**: normal


SpO2: 99


O2 Delivery: Room Air


Ordered Tests: 


                               Active Orders 24 hr











 Category Date Time Status


 


 IV Insertion STAT Care  10/21/22 12:00 Active


 


 NPO (ED) STAT Care  10/21/22 12:00 Active


 


 ABDOMEN AND PELVIS W/0 CONTRAS [CT] Stat Exams  10/21/22 13:46 Completed


 


 BLOOD CULTURE Stat Lab  10/21/22 12:00 Received


 


 CBC W DIFF Stat Lab  10/21/22 12:57 Completed


 


 CMP Stat Lab  10/21/22 12:57 Completed


 


 CULTURE,URINE Stat Lab  10/21/22 Received


 


 LIPASE Stat Lab  10/21/22 12:57 Completed


 


 Lactic Acid Stat Lab  10/21/22 13:00 Completed


 


 UA W/RFX CULTURE Stat Lab  10/21/22 Completed








Medication Summary














Discontinued Medications














Generic Name Dose Route Start Last Admin





  Trade Name Griselda  PRN Reason Stop Dose Admin


 


Hydromorphone HCl  0.5 mg  10/21/22 12:00  10/21/22 12:50





  Hydromorphone 1 Mg/1ml Inj*** 1 Mg/Ml Syringe  IV  10/21/22 12:01  0.5 mg





  STAT ONE   Administration


 


Hydromorphone HCl  Confirm  10/21/22 12:32 





  Hydromorphone 1 Mg/1ml Inj*** 1 Mg/Ml Syringe  Administered  10/21/22 12:33 





  Dose  





  1 mg  





  .ROUTE  





  .STK-MED ONE  


 


Hydromorphone HCl  0.5 mg  10/21/22 12:48  10/21/22 13:15





  Hydromorphone 1 Mg/1ml Inj*** 1 Mg/Ml Syringe  IV  10/21/22 12:49  0.5 mg





  STAT ONE   Administration


 


Hydromorphone HCl  Confirm  10/21/22 13:02 





  Hydromorphone 1 Mg/1ml Inj*** 1 Mg/Ml Syringe  Administered  10/21/22 13:03 





  Dose  





  1 mg  





  .ROUTE  





  .STK-MED ONE  


 


Hydromorphone HCl  0.5 mg  10/21/22 15:01  10/21/22 15:21





  Hydromorphone 1 Mg/1ml Inj*** 1 Mg/Ml Syringe  IV  10/21/22 15:02  0.5 mg





  STAT ONE   Administration


 


Hydromorphone HCl  Confirm  10/21/22 15:17 





  Hydromorphone 1 Mg/1ml Inj*** 1 Mg/Ml Syringe  Administered  10/21/22 15:18 





  Dose  





  1 mg  





  .ROUTE  





  .STK-MED ONE  


 


Ceftriaxone Sodium/Dextrose  2 g in 50 mls @ 100 mls/hr  10/21/22 15:01  

10/21/22 15:48





  Rocephin 2 Gm-D5w 50ml Bag**  IV  10/21/22 15:30  Infused





  STAT STA   Infusion


 


Ceftriaxone Sodium/Dextrose  Confirm  10/21/22 15:18 





  Rocephin 2 Gm-D5w 50ml Bag**  Administered  10/21/22 15:19 





  Dose  





  2 g in 50 mls @ ud  





  IV  





  .STK-MED ONE  


 


Ondansetron HCl  4 mg  10/21/22 12:00  10/21/22 12:51





  Ondansetron Hcl 4 Mg/2 Ml Vial  IV  10/21/22 12:01  4 mg





  STAT ONE   Administration


 


Ondansetron HCl  Confirm  10/21/22 12:32 





  Ondansetron Hcl 4 Mg/2 Ml Vial  Administered  10/21/22 12:33 





  Dose  





  4 mg  





  .ROUTE  





  .STK-MED ONE  











Lab/Rad Data: 


                           Laboratory Result Diagrams





                                 10/21/22 12:57 





                                 10/21/22 12:57 





                               Laboratory Results











  10/21/22 10/21/22 10/21/22 Range/Units





  Unknown 13:00 12:57 


 


WBC     (4.0-10.5)  x10^3/uL


 


RBC     (4.1-5.6)  x10^6/uL


 


Hgb     (12.5-18.0)  g/dL


 


Hct     (42-50)  %


 


MCV     ()  fL


 


MCH     (26-32)  pg


 


MCHC     (32-36)  g/dL


 


RDW     (11.5-14.0)  %


 


Plt Count     (150-450)  x10^3/uL


 


MPV     (7.5-11.0)  fL


 


Gran %     (36.0-66.0)  %


 


Immature Gran % (Auto)     (0.00-0.4)  %


 


Nucleat RBC Rel Count     (0.00-0.1)  %


 


Eos # (Auto)     (0-0.5)  x10^3/uL


 


Immature Gran # (Auto)     (0.00-0.03)  x10^3u/L


 


Absolute Lymphs (auto)     (1.0-4.6)  x10^3/uL


 


Absolute Monos (auto)     (0.0-1.3)  x10^3/uL


 


Absolute Nucleated RBC     (0.00-0.01)  x10^3u/L


 


Lymphocytes %     (24.0-44.0)  %


 


Monocytes %     (0.0-12.0)  %


 


Eosinophils %     (0.00-5.0)  %


 


Basophils %     (0.0-0.4)  %


 


Absolute Granulocytes     (1.4-6.9)  x10^3/uL


 


Basophils #     (0-0.4)  x10^3/uL


 


Sodium    139  (137-145)  mmol/L


 


Potassium    4.5  (3.5-5.1)  mmol/L


 


Chloride    104  ()  mmol/L


 


Carbon Dioxide    30  (22-30)  mmol/L


 


Anion Gap    8.9  (5-15)  MEQ/L


 


BUN    14  (9-20)  mg/dL


 


Creatinine    0.65 L  (0.66-1.25)  mg/dL


 


Estimated GFR    > 60.0  ML/MIN


 


Glucose    105  ()  mg/dL


 


Lactic Acid   0.6   (0.4-2.0)  


 


Calcium    8.7  (8.4-10.2)  mg/dL


 


Total Bilirubin    0.40  (0.2-1.3)  mg/dL


 


AST    37  (17-59)  U/L


 


ALT    18  (0-50)  U/L


 


Alkaline Phosphatase    152 H  ()  U/L


 


Serum Total Protein    8.1  (6.3-8.2)  g/dL


 


Albumin    3.9  (3.5-5.0)  g/dL


 


Lipase    25  ()  U/L


 


Urinalys Dipstick Clnc  MAIN LAB    


 


Urine Color  YELLOW    (YELLOW)  


 


Urine Appearance  CLEAR    (CLEAR)  


 


Urine pH  6.0    (5-6)  


 


Ur Specific Gravity  1.025    (1.005-1.025)  


 


POC Urine Protein Conf  100    (Negative)  


 


Urine Ketones  NEGATIVE    (NEGATIVE)  


 


Urine Nitrite  NEGATIVE    (NEGATIVE)  


 


Urine Bilirubin  NEGATIVE    (NEGATIVE)  


 


Urine Urobilinogen  0.2    (0-1)  mg/dL


 


Urine Leukocytes  SMALL    (NEGATIVE)  


 


Urine WBC (Auto)  26-50    (0-5)  /HPF


 


Urine RBC (Auto)      (0-2)  /HPF


 


U Epithel Cells (Auto)  RARE    (FEW)  /HPF


 


Urine Bacteria (Auto)  RARE    (NEGATIVE)  /HPF


 


Urine RBC  MODERATE    (0-5)  Kevin/ul


 


Urine Mucus (Auto)  SLIGHT    (NEGATIVE)  /HPF


 


Ur Culture Indicated?  YES    


 


Urine Glucose  NEGATIVE    (NEGATIVE)  mg/dL














  10/21/22 Range/Units





  12:57 


 


WBC  8.1  (4.0-10.5)  x10^3/uL


 


RBC  3.66 L  (4.1-5.6)  x10^6/uL


 


Hgb  10.0 L  (12.5-18.0)  g/dL


 


Hct  32.8 L  (42-50)  %


 


MCV  89.6  ()  fL


 


MCH  27.3  (26-32)  pg


 


MCHC  30.5 L  (32-36)  g/dL


 


RDW  13.3  (11.5-14.0)  %


 


Plt Count  201  (150-450)  x10^3/uL


 


MPV  9.1  (7.5-11.0)  fL


 


Gran %  83.1 H  (36.0-66.0)  %


 


Immature Gran % (Auto)  0.2  (0.00-0.4)  %


 


Nucleat RBC Rel Count  0.0  (0.00-0.1)  %


 


Eos # (Auto)  0.02  (0-0.5)  x10^3/uL


 


Immature Gran # (Auto)  0.02  (0.00-0.03)  x10^3u/L


 


Absolute Lymphs (auto)  0.84 L  (1.0-4.6)  x10^3/uL


 


Absolute Monos (auto)  0.49  (0.0-1.3)  x10^3/uL


 


Absolute Nucleated RBC  0.00  (0.00-0.01)  x10^3u/L


 


Lymphocytes %  10.3 L  (24.0-44.0)  %


 


Monocytes %  6.0  (0.0-12.0)  %


 


Eosinophils %  0.2  (0.00-5.0)  %


 


Basophils %  0.2  (0.0-0.4)  %


 


Absolute Granulocytes  6.74  (1.4-6.9)  x10^3/uL


 


Basophils #  0.02  (0-0.4)  x10^3/uL


 


Sodium   (137-145)  mmol/L


 


Potassium   (3.5-5.1)  mmol/L


 


Chloride   ()  mmol/L


 


Carbon Dioxide   (22-30)  mmol/L


 


Anion Gap   (5-15)  MEQ/L


 


BUN   (9-20)  mg/dL


 


Creatinine   (0.66-1.25)  mg/dL


 


Estimated GFR   ML/MIN


 


Glucose   ()  mg/dL


 


Lactic Acid   (0.4-2.0)  


 


Calcium   (8.4-10.2)  mg/dL


 


Total Bilirubin   (0.2-1.3)  mg/dL


 


AST   (17-59)  U/L


 


ALT   (0-50)  U/L


 


Alkaline Phosphatase   ()  U/L


 


Serum Total Protein   (6.3-8.2)  g/dL


 


Albumin   (3.5-5.0)  g/dL


 


Lipase   ()  U/L


 


Urinalys Dipstick Clnc   


 


Urine Color   (YELLOW)  


 


Urine Appearance   (CLEAR)  


 


Urine pH   (5-6)  


 


Ur Specific Gravity   (1.005-1.025)  


 


POC Urine Protein Conf   (Negative)  


 


Urine Ketones   (NEGATIVE)  


 


Urine Nitrite   (NEGATIVE)  


 


Urine Bilirubin   (NEGATIVE)  


 


Urine Urobilinogen   (0-1)  mg/dL


 


Urine Leukocytes   (NEGATIVE)  


 


Urine WBC (Auto)   (0-5)  /HPF


 


Urine RBC (Auto)   (0-2)  /HPF


 


U Epithel Cells (Auto)   (FEW)  /HPF


 


Urine Bacteria (Auto)   (NEGATIVE)  /HPF


 


Urine RBC   (0-5)  Kevin/ul


 


Urine Mucus (Auto)   (NEGATIVE)  /HPF


 


Ur Culture Indicated?   


 


Urine Glucose   (NEGATIVE)  mg/dL














- Progress


Progress: improved, pain not gone completely, re-examined


Progress Note: 





10/21/22 15:31


77 years old is evaluated for left flank pain.  He is given pain 

medications/Dilaudid multiple times to control his pain.  Patient has normal 

white count, grossly unremarkable chemistries.  Does have UTI and given a dose 

of Rocephin.  CT abdomen pelvis showed increasing size of left staghorn stone 

from 3.9 to 5.2 cm but probably the reason for his pain.  I have discussed with 

Dr. Crawford who recommended transfer to facility with urology services and I do 

agree with him because of his pain which is worsening may need some intervention

 by urology especially in the setting of UTI, high risk for sepsis.  Plan 

discussed with patient and family who understand and agree with plan of transf

er.


10/21/22 16:27


Discussed with Dr. Garcia at [facility], reviewed history, work-up 

and current management, agreed with transfer.


Discussed with : Pako, Other


Counseled pt/family regarding: lab results, diagnosis, rad results





- Departure


Departure Disposition: Transfer


Clinical Impression: 


 Acute UTI, Staghorn renal calculus





Condition: Stable


Critical Care Time: No


Referrals: 


LETI CANTOR [Primary Care Provider] - Follow up/PCP as directed